# Patient Record
Sex: MALE | Race: WHITE | NOT HISPANIC OR LATINO | Employment: OTHER | URBAN - METROPOLITAN AREA
[De-identification: names, ages, dates, MRNs, and addresses within clinical notes are randomized per-mention and may not be internally consistent; named-entity substitution may affect disease eponyms.]

---

## 2017-03-21 ENCOUNTER — TRANSCRIBE ORDERS (OUTPATIENT)
Dept: LAB | Facility: CLINIC | Age: 62
End: 2017-03-21

## 2017-03-21 ENCOUNTER — APPOINTMENT (OUTPATIENT)
Dept: LAB | Facility: CLINIC | Age: 62
End: 2017-03-21
Payer: MEDICARE

## 2017-03-21 ENCOUNTER — LAB (OUTPATIENT)
Dept: LAB | Facility: CLINIC | Age: 62
End: 2017-03-21
Payer: MEDICARE

## 2017-03-21 DIAGNOSIS — N40.1 ENLARGED PROSTATE WITH URINARY OBSTRUCTION: ICD-10-CM

## 2017-03-21 DIAGNOSIS — Z00.00 ROUTINE GENERAL MEDICAL EXAMINATION AT A HEALTH CARE FACILITY: Primary | ICD-10-CM

## 2017-03-21 DIAGNOSIS — Z00.00 ROUTINE GENERAL MEDICAL EXAMINATION AT A HEALTH CARE FACILITY: ICD-10-CM

## 2017-03-21 DIAGNOSIS — N13.8 ENLARGED PROSTATE WITH URINARY OBSTRUCTION: Primary | ICD-10-CM

## 2017-03-21 DIAGNOSIS — N40.1 ENLARGED PROSTATE WITH URINARY OBSTRUCTION: Primary | ICD-10-CM

## 2017-03-21 DIAGNOSIS — N13.8 ENLARGED PROSTATE WITH URINARY OBSTRUCTION: ICD-10-CM

## 2017-03-21 LAB
ALBUMIN SERPL BCP-MCNC: 3.8 G/DL (ref 3.5–5)
ALP SERPL-CCNC: 55 U/L (ref 46–116)
ALT SERPL W P-5'-P-CCNC: 29 U/L (ref 12–78)
ANION GAP SERPL CALCULATED.3IONS-SCNC: 5 MMOL/L (ref 4–13)
AST SERPL W P-5'-P-CCNC: 24 U/L (ref 5–45)
BASOPHILS # BLD AUTO: 0.01 THOUSANDS/ΜL (ref 0–0.1)
BASOPHILS NFR BLD AUTO: 0 % (ref 0–1)
BILIRUB SERPL-MCNC: 0.5 MG/DL (ref 0.2–1)
BILIRUB UR QL STRIP: NEGATIVE
BUN SERPL-MCNC: 17 MG/DL (ref 5–25)
CALCIUM SERPL-MCNC: 8.6 MG/DL (ref 8.3–10.1)
CHLORIDE SERPL-SCNC: 105 MMOL/L (ref 100–108)
CHOLEST SERPL-MCNC: 176 MG/DL (ref 50–200)
CLARITY UR: CLEAR
CO2 SERPL-SCNC: 29 MMOL/L (ref 21–32)
COLOR UR: YELLOW
CREAT SERPL-MCNC: 0.88 MG/DL (ref 0.6–1.3)
EOSINOPHIL # BLD AUTO: 0.37 THOUSAND/ΜL (ref 0–0.61)
EOSINOPHIL NFR BLD AUTO: 6 % (ref 0–6)
ERYTHROCYTE [DISTWIDTH] IN BLOOD BY AUTOMATED COUNT: 13.2 % (ref 11.6–15.1)
GFR SERPL CREATININE-BSD FRML MDRD: >60 ML/MIN/1.73SQ M
GLUCOSE P FAST SERPL-MCNC: 83 MG/DL (ref 65–99)
GLUCOSE UR STRIP-MCNC: NEGATIVE MG/DL
HCT VFR BLD AUTO: 39.8 % (ref 36.5–49.3)
HDLC SERPL-MCNC: 61 MG/DL (ref 40–60)
HGB BLD-MCNC: 13.4 G/DL (ref 12–17)
HGB UR QL STRIP.AUTO: NEGATIVE
KETONES UR STRIP-MCNC: NEGATIVE MG/DL
LDLC SERPL CALC-MCNC: 102 MG/DL (ref 0–100)
LEUKOCYTE ESTERASE UR QL STRIP: NEGATIVE
LYMPHOCYTES # BLD AUTO: 1.54 THOUSANDS/ΜL (ref 0.6–4.47)
LYMPHOCYTES NFR BLD AUTO: 23 % (ref 14–44)
MCH RBC QN AUTO: 27.4 PG (ref 26.8–34.3)
MCHC RBC AUTO-ENTMCNC: 33.7 G/DL (ref 31.4–37.4)
MCV RBC AUTO: 81 FL (ref 82–98)
MONOCYTES # BLD AUTO: 0.35 THOUSAND/ΜL (ref 0.17–1.22)
MONOCYTES NFR BLD AUTO: 5 % (ref 4–12)
NEUTROPHILS # BLD AUTO: 4.43 THOUSANDS/ΜL (ref 1.85–7.62)
NEUTS SEG NFR BLD AUTO: 66 % (ref 43–75)
NITRITE UR QL STRIP: NEGATIVE
PH UR STRIP.AUTO: 6 [PH] (ref 4.5–8)
PLATELET # BLD AUTO: 302 THOUSANDS/UL (ref 149–390)
PMV BLD AUTO: 10.3 FL (ref 8.9–12.7)
POTASSIUM SERPL-SCNC: 3.9 MMOL/L (ref 3.5–5.3)
PROT SERPL-MCNC: 7.6 G/DL (ref 6.4–8.2)
PROT UR STRIP-MCNC: NEGATIVE MG/DL
PSA SERPL-MCNC: 0.4 NG/ML (ref 0–4)
RBC # BLD AUTO: 4.89 MILLION/UL (ref 3.88–5.62)
SODIUM SERPL-SCNC: 139 MMOL/L (ref 136–145)
SP GR UR STRIP.AUTO: 1.02 (ref 1–1.03)
TRIGL SERPL-MCNC: 67 MG/DL
TSH SERPL DL<=0.05 MIU/L-ACNC: 1.6 UIU/ML (ref 0.36–3.74)
UROBILINOGEN UR QL STRIP.AUTO: 0.2 E.U./DL
WBC # BLD AUTO: 6.7 THOUSAND/UL (ref 4.31–10.16)

## 2017-03-21 PROCEDURE — 84443 ASSAY THYROID STIM HORMONE: CPT

## 2017-03-21 PROCEDURE — 80061 LIPID PANEL: CPT

## 2017-03-21 PROCEDURE — 80053 COMPREHEN METABOLIC PANEL: CPT

## 2017-03-21 PROCEDURE — 85025 COMPLETE CBC W/AUTO DIFF WBC: CPT

## 2017-03-21 PROCEDURE — 36415 COLL VENOUS BLD VENIPUNCTURE: CPT

## 2017-03-21 PROCEDURE — 81003 URINALYSIS AUTO W/O SCOPE: CPT | Performed by: FAMILY MEDICINE

## 2017-03-21 PROCEDURE — 86803 HEPATITIS C AB TEST: CPT

## 2017-03-21 PROCEDURE — G0103 PSA SCREENING: HCPCS

## 2017-03-22 LAB — HCV AB SER QL: NORMAL

## 2017-08-11 ENCOUNTER — TRANSCRIBE ORDERS (OUTPATIENT)
Dept: LAB | Facility: CLINIC | Age: 62
End: 2017-08-11

## 2017-08-11 ENCOUNTER — APPOINTMENT (OUTPATIENT)
Dept: LAB | Facility: CLINIC | Age: 62
End: 2017-08-11
Payer: MEDICARE

## 2017-08-11 DIAGNOSIS — N42.9 UNSPECIFIED DISORDER OF PROSTATE: Primary | ICD-10-CM

## 2017-08-11 DIAGNOSIS — N42.9 UNSPECIFIED DISORDER OF PROSTATE: ICD-10-CM

## 2017-08-11 LAB
PSA SERPL-MCNC: 0.3 NG/ML (ref 0–4)
TESTOST SERPL-MCNC: 241 NG/DL (ref 241–827)

## 2017-08-11 PROCEDURE — 84403 ASSAY OF TOTAL TESTOSTERONE: CPT

## 2017-08-11 PROCEDURE — 84153 ASSAY OF PSA TOTAL: CPT

## 2017-08-11 PROCEDURE — 36415 COLL VENOUS BLD VENIPUNCTURE: CPT

## 2018-02-23 ENCOUNTER — TRANSCRIBE ORDERS (OUTPATIENT)
Dept: LAB | Facility: CLINIC | Age: 63
End: 2018-02-23

## 2018-02-23 ENCOUNTER — LAB (OUTPATIENT)
Dept: LAB | Facility: CLINIC | Age: 63
End: 2018-02-23
Payer: MEDICARE

## 2018-02-23 DIAGNOSIS — N40.0 BENIGN PROSTATIC HYPERPLASIA, UNSPECIFIED WHETHER LOWER URINARY TRACT SYMPTOMS PRESENT: Primary | ICD-10-CM

## 2018-02-23 DIAGNOSIS — N40.0 BENIGN PROSTATIC HYPERPLASIA, UNSPECIFIED WHETHER LOWER URINARY TRACT SYMPTOMS PRESENT: ICD-10-CM

## 2018-02-23 DIAGNOSIS — E29.1 3-OXO-5 ALPHA-STEROID DELTA 4-DEHYDROGENASE DEFICIENCY: ICD-10-CM

## 2018-02-23 LAB
BUN SERPL-MCNC: 15 MG/DL (ref 5–25)
CREAT SERPL-MCNC: 0.87 MG/DL (ref 0.6–1.3)
GFR SERPL CREATININE-BSD FRML MDRD: 92 ML/MIN/1.73SQ M
PSA SERPL-MCNC: 0.1 NG/ML (ref 0–4)

## 2018-02-23 PROCEDURE — 84402 ASSAY OF FREE TESTOSTERONE: CPT

## 2018-02-23 PROCEDURE — G0103 PSA SCREENING: HCPCS

## 2018-02-23 PROCEDURE — 36415 COLL VENOUS BLD VENIPUNCTURE: CPT

## 2018-02-23 PROCEDURE — 82565 ASSAY OF CREATININE: CPT

## 2018-02-23 PROCEDURE — 84520 ASSAY OF UREA NITROGEN: CPT

## 2018-02-23 PROCEDURE — 84403 ASSAY OF TOTAL TESTOSTERONE: CPT

## 2018-02-24 LAB
TESTOST FREE SERPL-MCNC: 0.8 PG/ML (ref 6.6–18.1)
TESTOST SERPL-MCNC: <3 NG/DL (ref 264–916)

## 2019-04-10 ENCOUNTER — HOSPITAL ENCOUNTER (EMERGENCY)
Facility: HOSPITAL | Age: 64
Discharge: HOME | End: 2019-04-10
Attending: EMERGENCY MEDICINE | Admitting: EMERGENCY MEDICINE
Payer: MEDICARE

## 2019-04-10 ENCOUNTER — APPOINTMENT (EMERGENCY)
Dept: RADIOLOGY | Facility: HOSPITAL | Age: 64
End: 2019-04-10
Payer: MEDICARE

## 2019-04-10 VITALS
OXYGEN SATURATION: 97 % | DIASTOLIC BLOOD PRESSURE: 86 MMHG | RESPIRATION RATE: 18 BRPM | HEART RATE: 94 BPM | TEMPERATURE: 99.6 F | SYSTOLIC BLOOD PRESSURE: 150 MMHG

## 2019-04-10 DIAGNOSIS — J20.9 ACUTE WHEEZY BRONCHITIS: Primary | ICD-10-CM

## 2019-04-10 PROCEDURE — 99283 EMERGENCY DEPT VISIT LOW MDM: CPT | Performed by: PHYSICIAN ASSISTANT

## 2019-04-10 PROCEDURE — 99283 EMERGENCY DEPT VISIT LOW MDM: CPT

## 2019-04-10 PROCEDURE — 71046 X-RAY EXAM CHEST 2 VIEWS: CPT

## 2019-04-10 PROCEDURE — 94640 AIRWAY INHALATION TREATMENT: CPT

## 2019-04-10 RX ORDER — PROMETHAZINE HYDROCHLORIDE AND CODEINE PHOSPHATE 6.25; 1 MG/5ML; MG/5ML
5 SYRUP ORAL EVERY 4 HOURS PRN
Qty: 120 ML | Refills: 0 | Status: SHIPPED | OUTPATIENT
Start: 2019-04-10 | End: 2019-04-20

## 2019-04-10 RX ORDER — PREDNISONE 20 MG/1
60 TABLET ORAL ONCE
Status: COMPLETED | OUTPATIENT
Start: 2019-04-10 | End: 2019-04-10

## 2019-04-10 RX ORDER — AZITHROMYCIN 250 MG/1
TABLET, FILM COATED ORAL
Qty: 6 TABLET | Refills: 0 | Status: SHIPPED | OUTPATIENT
Start: 2019-04-10 | End: 2019-04-14

## 2019-04-10 RX ORDER — PREDNISONE 20 MG/1
60 TABLET ORAL DAILY
Qty: 12 TABLET | Refills: 0 | Status: SHIPPED | OUTPATIENT
Start: 2019-04-10 | End: 2019-04-14

## 2019-04-10 RX ORDER — GUAIFENESIN 600 MG
600 TABLET, EXTENDED RELEASE 12 HR ORAL ONCE
Status: COMPLETED | OUTPATIENT
Start: 2019-04-10 | End: 2019-04-10

## 2019-04-10 RX ADMIN — GUAIFENESIN 600 MG: 600 TABLET, EXTENDED RELEASE ORAL at 21:56

## 2019-04-10 RX ADMIN — PREDNISONE 60 MG: 20 TABLET ORAL at 21:56

## 2019-04-10 RX ADMIN — IPRATROPIUM BROMIDE 0.5 MG: 0.5 SOLUTION RESPIRATORY (INHALATION) at 21:57

## 2019-04-10 RX ADMIN — ALBUTEROL SULFATE 5 MG: 2.5 SOLUTION RESPIRATORY (INHALATION) at 21:57

## 2019-06-05 ENCOUNTER — APPOINTMENT (EMERGENCY)
Dept: RADIOLOGY | Facility: HOSPITAL | Age: 64
End: 2019-06-05
Payer: MEDICARE

## 2019-06-05 ENCOUNTER — HOSPITAL ENCOUNTER (EMERGENCY)
Facility: HOSPITAL | Age: 64
Discharge: HOME | End: 2019-06-05
Attending: EMERGENCY MEDICINE | Admitting: EMERGENCY MEDICINE
Payer: MEDICARE

## 2019-06-05 VITALS
WEIGHT: 201.6 LBS | OXYGEN SATURATION: 96 % | RESPIRATION RATE: 18 BRPM | DIASTOLIC BLOOD PRESSURE: 68 MMHG | SYSTOLIC BLOOD PRESSURE: 154 MMHG | BODY MASS INDEX: 28.93 KG/M2 | HEART RATE: 75 BPM

## 2019-06-05 DIAGNOSIS — S62.307A CLOSED NONDISPLACED FRACTURE OF FIFTH METACARPAL BONE OF LEFT HAND, UNSPECIFIED PORTION OF METACARPAL, INITIAL ENCOUNTER: Primary | ICD-10-CM

## 2019-06-05 PROCEDURE — 99284 EMERGENCY DEPT VISIT MOD MDM: CPT | Performed by: PHYSICIAN ASSISTANT

## 2019-06-05 PROCEDURE — 73130 X-RAY EXAM OF HAND: CPT

## 2019-06-05 PROCEDURE — 99283 EMERGENCY DEPT VISIT LOW MDM: CPT

## 2019-06-05 RX ORDER — IBUPROFEN 400 MG/1
800 TABLET ORAL ONCE
Status: DISCONTINUED | OUTPATIENT
Start: 2019-06-05 | End: 2019-06-05 | Stop reason: HOSPADM

## 2019-06-10 ENCOUNTER — OFFICE VISIT (OUTPATIENT)
Dept: OCCUPATIONAL THERAPY | Facility: CLINIC | Age: 64
End: 2019-06-10
Payer: MEDICARE

## 2019-06-10 VITALS
HEIGHT: 69 IN | WEIGHT: 200 LBS | DIASTOLIC BLOOD PRESSURE: 74 MMHG | HEART RATE: 72 BPM | BODY MASS INDEX: 29.62 KG/M2 | SYSTOLIC BLOOD PRESSURE: 122 MMHG

## 2019-06-10 DIAGNOSIS — S62.307A CLOSED NONDISPLACED FRACTURE OF FIFTH METACARPAL BONE OF LEFT HAND, UNSPECIFIED PORTION OF METACARPAL, INITIAL ENCOUNTER: Primary | ICD-10-CM

## 2019-06-10 PROCEDURE — L3906 WHO W/O JOINTS CF: HCPCS | Performed by: OCCUPATIONAL THERAPIST

## 2019-06-10 PROCEDURE — 99203 OFFICE O/P NEW LOW 30 MIN: CPT | Performed by: SURGERY

## 2019-06-19 ENCOUNTER — APPOINTMENT (OUTPATIENT)
Dept: RADIOLOGY | Facility: AMBULARY SURGERY CENTER | Age: 64
End: 2019-06-19
Attending: SURGERY
Payer: MEDICARE

## 2019-06-19 VITALS
HEIGHT: 70 IN | WEIGHT: 204 LBS | BODY MASS INDEX: 29.2 KG/M2 | SYSTOLIC BLOOD PRESSURE: 129 MMHG | DIASTOLIC BLOOD PRESSURE: 80 MMHG | HEART RATE: 78 BPM

## 2019-06-19 DIAGNOSIS — S62.307A CLOSED NONDISPLACED FRACTURE OF FIFTH METACARPAL BONE OF LEFT HAND, UNSPECIFIED PORTION OF METACARPAL, INITIAL ENCOUNTER: ICD-10-CM

## 2019-06-19 DIAGNOSIS — M79.641 RIGHT HAND PAIN: ICD-10-CM

## 2019-06-19 DIAGNOSIS — S62.307A CLOSED NONDISPLACED FRACTURE OF FIFTH METACARPAL BONE OF LEFT HAND, UNSPECIFIED PORTION OF METACARPAL, INITIAL ENCOUNTER: Primary | ICD-10-CM

## 2019-06-19 PROCEDURE — 99213 OFFICE O/P EST LOW 20 MIN: CPT | Performed by: SURGERY

## 2019-06-19 PROCEDURE — 73130 X-RAY EXAM OF HAND: CPT

## 2019-07-02 ENCOUNTER — TRANSCRIBE ORDERS (OUTPATIENT)
Dept: LAB | Facility: CLINIC | Age: 64
End: 2019-07-02

## 2019-07-02 ENCOUNTER — APPOINTMENT (OUTPATIENT)
Dept: LAB | Facility: CLINIC | Age: 64
End: 2019-07-02
Payer: MEDICARE

## 2019-07-02 DIAGNOSIS — E78.2 MIXED HYPERLIPIDEMIA: ICD-10-CM

## 2019-07-02 DIAGNOSIS — I10 HYPERTENSION, UNSPECIFIED TYPE: Primary | ICD-10-CM

## 2019-07-02 DIAGNOSIS — I10 HYPERTENSION, UNSPECIFIED TYPE: ICD-10-CM

## 2019-07-02 DIAGNOSIS — Z12.5 SPECIAL SCREENING FOR MALIGNANT NEOPLASM OF PROSTATE: ICD-10-CM

## 2019-07-02 LAB
ALBUMIN SERPL BCP-MCNC: 3.8 G/DL (ref 3.5–5)
ALP SERPL-CCNC: 52 U/L (ref 46–116)
ALT SERPL W P-5'-P-CCNC: 26 U/L (ref 12–78)
ANION GAP SERPL CALCULATED.3IONS-SCNC: 9 MMOL/L (ref 4–13)
AST SERPL W P-5'-P-CCNC: 21 U/L (ref 5–45)
BASOPHILS # BLD AUTO: 0.03 THOUSANDS/ΜL (ref 0–0.1)
BASOPHILS NFR BLD AUTO: 0 % (ref 0–1)
BILIRUB SERPL-MCNC: 0.6 MG/DL (ref 0.2–1)
BILIRUB UR QL STRIP: NEGATIVE
BUN SERPL-MCNC: 18 MG/DL (ref 5–25)
CALCIUM SERPL-MCNC: 8.5 MG/DL (ref 8.3–10.1)
CHLORIDE SERPL-SCNC: 104 MMOL/L (ref 100–108)
CHOLEST SERPL-MCNC: 181 MG/DL (ref 50–200)
CLARITY UR: CLEAR
CO2 SERPL-SCNC: 26 MMOL/L (ref 21–32)
COLOR UR: YELLOW
CREAT SERPL-MCNC: 0.94 MG/DL (ref 0.6–1.3)
EOSINOPHIL # BLD AUTO: 0.19 THOUSAND/ΜL (ref 0–0.61)
EOSINOPHIL NFR BLD AUTO: 3 % (ref 0–6)
ERYTHROCYTE [DISTWIDTH] IN BLOOD BY AUTOMATED COUNT: 12.9 % (ref 11.6–15.1)
ERYTHROCYTE [SEDIMENTATION RATE] IN BLOOD: 5 MM/HOUR (ref 0–10)
GFR SERPL CREATININE-BSD FRML MDRD: 85 ML/MIN/1.73SQ M
GLUCOSE P FAST SERPL-MCNC: 88 MG/DL (ref 65–99)
GLUCOSE UR STRIP-MCNC: NEGATIVE MG/DL
HCT VFR BLD AUTO: 40.5 % (ref 36.5–49.3)
HDLC SERPL-MCNC: 49 MG/DL (ref 40–60)
HGB BLD-MCNC: 13.8 G/DL (ref 12–17)
HGB UR QL STRIP.AUTO: NEGATIVE
IMM GRANULOCYTES # BLD AUTO: 0.03 THOUSAND/UL (ref 0–0.2)
IMM GRANULOCYTES NFR BLD AUTO: 0 % (ref 0–2)
IRON SERPL-MCNC: 102 UG/DL (ref 65–175)
KETONES UR STRIP-MCNC: NEGATIVE MG/DL
LDH SERPL-CCNC: 194 U/L (ref 81–234)
LDLC SERPL CALC-MCNC: 115 MG/DL (ref 0–100)
LEUKOCYTE ESTERASE UR QL STRIP: NEGATIVE
LYMPHOCYTES # BLD AUTO: 1.56 THOUSANDS/ΜL (ref 0.6–4.47)
LYMPHOCYTES NFR BLD AUTO: 23 % (ref 14–44)
MCH RBC QN AUTO: 28.3 PG (ref 26.8–34.3)
MCHC RBC AUTO-ENTMCNC: 34.1 G/DL (ref 31.4–37.4)
MCV RBC AUTO: 83 FL (ref 82–98)
MONOCYTES # BLD AUTO: 0.48 THOUSAND/ΜL (ref 0.17–1.22)
MONOCYTES NFR BLD AUTO: 7 % (ref 4–12)
NEUTROPHILS # BLD AUTO: 4.43 THOUSANDS/ΜL (ref 1.85–7.62)
NEUTS SEG NFR BLD AUTO: 67 % (ref 43–75)
NITRITE UR QL STRIP: NEGATIVE
NONHDLC SERPL-MCNC: 132 MG/DL
NRBC BLD AUTO-RTO: 0 /100 WBCS
PH UR STRIP.AUTO: 6.5 [PH]
PLATELET # BLD AUTO: 291 THOUSANDS/UL (ref 149–390)
PMV BLD AUTO: 10.1 FL (ref 8.9–12.7)
POTASSIUM SERPL-SCNC: 3.9 MMOL/L (ref 3.5–5.3)
PROT SERPL-MCNC: 7.4 G/DL (ref 6.4–8.2)
PROT UR STRIP-MCNC: NEGATIVE MG/DL
PSA SERPL-MCNC: 0.7 NG/ML (ref 0–4)
RBC # BLD AUTO: 4.88 MILLION/UL (ref 3.88–5.62)
SODIUM SERPL-SCNC: 139 MMOL/L (ref 136–145)
SP GR UR STRIP.AUTO: 1.02 (ref 1–1.03)
TRIGL SERPL-MCNC: 86 MG/DL
TSH SERPL DL<=0.05 MIU/L-ACNC: 0.92 UIU/ML (ref 0.36–3.74)
UROBILINOGEN UR QL STRIP.AUTO: 0.2 E.U./DL
WBC # BLD AUTO: 6.72 THOUSAND/UL (ref 4.31–10.16)

## 2019-07-02 PROCEDURE — 83615 LACTATE (LD) (LDH) ENZYME: CPT

## 2019-07-02 PROCEDURE — 36415 COLL VENOUS BLD VENIPUNCTURE: CPT

## 2019-07-02 PROCEDURE — 84443 ASSAY THYROID STIM HORMONE: CPT

## 2019-07-02 PROCEDURE — 83540 ASSAY OF IRON: CPT

## 2019-07-02 PROCEDURE — 80061 LIPID PANEL: CPT

## 2019-07-02 PROCEDURE — 85652 RBC SED RATE AUTOMATED: CPT

## 2019-07-02 PROCEDURE — 85025 COMPLETE CBC W/AUTO DIFF WBC: CPT

## 2019-07-02 PROCEDURE — 81003 URINALYSIS AUTO W/O SCOPE: CPT | Performed by: INTERNAL MEDICINE

## 2019-07-02 PROCEDURE — G0103 PSA SCREENING: HCPCS

## 2019-07-02 PROCEDURE — 80053 COMPREHEN METABOLIC PANEL: CPT

## 2019-07-03 ENCOUNTER — APPOINTMENT (OUTPATIENT)
Dept: RADIOLOGY | Facility: AMBULARY SURGERY CENTER | Age: 64
End: 2019-07-03
Attending: SURGERY
Payer: MEDICARE

## 2019-07-03 VITALS
SYSTOLIC BLOOD PRESSURE: 132 MMHG | DIASTOLIC BLOOD PRESSURE: 77 MMHG | HEART RATE: 60 BPM | BODY MASS INDEX: 28.7 KG/M2 | HEIGHT: 71 IN | WEIGHT: 205 LBS

## 2019-07-03 DIAGNOSIS — S62.307A CLOSED NONDISPLACED FRACTURE OF FIFTH METACARPAL BONE OF LEFT HAND, UNSPECIFIED PORTION OF METACARPAL, INITIAL ENCOUNTER: Primary | ICD-10-CM

## 2019-07-03 DIAGNOSIS — S62.307A CLOSED NONDISPLACED FRACTURE OF FIFTH METACARPAL BONE OF LEFT HAND, UNSPECIFIED PORTION OF METACARPAL, INITIAL ENCOUNTER: ICD-10-CM

## 2019-07-03 PROCEDURE — 99213 OFFICE O/P EST LOW 20 MIN: CPT | Performed by: SURGERY

## 2019-07-03 PROCEDURE — 73130 X-RAY EXAM OF HAND: CPT

## 2019-07-03 NOTE — PROGRESS NOTES
ASSESSMENT/PLAN:      51-year-old male with left non-displaced intra-articular chip fracture at the base of the 5th metacarpal and displaced 5th metacarpal neck fx, date of injury 06/05/2019  X-rays today show moderately displaced metacarpal neck fracture stable from previous x-ray and well-healing nondisplaced 5th metacarpal chip fracture  Patient doing overall well  We will keep him in the splint for another 2 weeks to for total of 6 weeks of immobilization  In 2 weeks we will see him back for repeat x-rays and re-evaluation, at this time we will likely discontinue splint altogether and release him for activities as tolerated  The patient verbalized understanding of exam findings and treatment plan  We engaged in the shared decision-making process and treatment options were discussed at length with the patient  Surgical and conservative management discussed today along with risks and benefits  Diagnoses and all orders for this visit:    Closed nondisplaced fracture of fifth metacarpal bone of left hand, unspecified portion of metacarpal, initial encounter  -     XR hand 3+ vw left; Future        Follow Up:  Return in about 2 weeks (around 7/17/2019) for Recheck  To Do Next Visit:  Re-evaluation of current issue and X-rays of the  left  hand    ____________________________________________________________________________________________________________________________________________      CHIEF COMPLAINT:  Chief Complaint   Patient presents with    Left Arm - Follow-up       SUBJECTIVE:  Anastasia Colvin is a 59y o  year old male who presents for follow up evaluation of left 5th metacarpal fracture at the base and the neck, date of injury 06/05/2019  Patient has been compliant in his splint for the past 4 weeks  He denies significant pain currently and has no numbness and tingling  I have personally reviewed all the relevant PMH, PSH, SH, FH, Medications and allergies       PAST MEDICAL HISTORY:  Past Medical History:   Diagnosis Date    Asthma     Hypertension        PAST SURGICAL HISTORY:  History reviewed  No pertinent surgical history  FAMILY HISTORY:  History reviewed  No pertinent family history  SOCIAL HISTORY:  Social History     Tobacco Use    Smoking status: Never Smoker    Smokeless tobacco: Never Used   Substance Use Topics    Alcohol use: Yes     Comment: seldom    Drug use: No       MEDICATIONS:    Current Outpatient Medications:     ALBUTEROL SULFATE IN, Inhale , Disp: , Rfl:     budesonide-formoterol (SYMBICORT) 160-4 5 mcg/act inhaler, Inhale 2 puffs 2 (two) times a day , Disp: , Rfl:     loratadine (CLARITIN) 10 mg tablet, Take 10 mg by mouth daily  , Disp: , Rfl:     MINOXIDIL PO, Take by mouth , Disp: , Rfl:     ALLERGIES:  No Known Allergies    REVIEW OF SYSTEMS:  Review of Systems   Constitutional: Negative for chills, fatigue, fever and unexpected weight change  HENT: Negative for congestion, dental problem, facial swelling, hearing loss, sneezing, sore throat, trouble swallowing and voice change  Respiratory: Negative for chest tightness, shortness of breath, wheezing and stridor  Cardiovascular: Negative for chest pain, palpitations and leg swelling  Endocrine: Negative for cold intolerance and heat intolerance  Genitourinary: Negative  Musculoskeletal: Negative for arthralgias, joint swelling, myalgias and neck pain  Skin: Negative for color change, pallor, rash and wound  Neurological: Negative for tremors, facial asymmetry, speech difficulty, weakness and numbness         VITALS:  Vitals:    07/03/19 1409   BP: 132/77   Pulse: 60       LABS:  HgA1c: No results found for: HGBA1C  BMP:   Lab Results   Component Value Date    CALCIUM 8 5 07/02/2019    K 3 9 07/02/2019    CO2 26 07/02/2019     07/02/2019    BUN 18 07/02/2019    CREATININE 0 94 07/02/2019       _____________________________________________________  PHYSICAL EXAMINATION:  General: well developed and well nourished, alert, oriented times 3 and appears comfortable  Psychiatric: Normal  HEENT: Normocephalic, Atraumatic Trachea Midline, No torticollis  Pulmonary: No audible wheezing or respiratory distress   Cardiovascular: No pitting edema, 2+ radial pulse   Skin: No masses, erythema, lacerations, fluctation, ulcerations  Neurovascular: Sensation Intact to the Median, Ulnar, Radial Nerve, Motor Intact to the Median, Ulnar, Radial Nerve and Pulses Intact  Musculoskeletal: Normal, except as noted in detailed exam and in HPI  MUSCULOSKELETAL EXAMINATION:  No significant edema, erythema, ecchymosis  Small palpable defect in the area of the 5th metacarpal neck fracture, but no tenderness with palpation  Full composite fist as possible, opposition intact  Sensation intact to light touch    ___________________________________________________  STUDIES REVIEWED:  I have personally reviewed AP lateral and oblique radiographs of left hand   which demonstrate moderately displaced 5th metacarpal neck fracture, stable from previous x-ray but displaced compared to the original injury films  Small intra-articular 5th metacarpal base fracture, nondisplaced, barely visible  PROCEDURES PERFORMED:  Procedures  No Procedures performed today    _____________________________________________________      PA Cosign: I supervised the physician assistant and discussed the case with them  I personally performed history and physical exam  I agree with the assessment and plan of care as documented by the physician assistant

## 2019-07-24 ENCOUNTER — APPOINTMENT (OUTPATIENT)
Dept: RADIOLOGY | Facility: AMBULARY SURGERY CENTER | Age: 64
End: 2019-07-24
Attending: SURGERY
Payer: MEDICARE

## 2019-07-24 VITALS
WEIGHT: 200 LBS | HEART RATE: 72 BPM | DIASTOLIC BLOOD PRESSURE: 71 MMHG | SYSTOLIC BLOOD PRESSURE: 148 MMHG | HEIGHT: 71 IN | BODY MASS INDEX: 28 KG/M2

## 2019-07-24 DIAGNOSIS — S62.307A CLOSED NONDISPLACED FRACTURE OF FIFTH METACARPAL BONE OF LEFT HAND, UNSPECIFIED PORTION OF METACARPAL, INITIAL ENCOUNTER: Primary | ICD-10-CM

## 2019-07-24 DIAGNOSIS — S62.307A CLOSED NONDISPLACED FRACTURE OF FIFTH METACARPAL BONE OF LEFT HAND, UNSPECIFIED PORTION OF METACARPAL, INITIAL ENCOUNTER: ICD-10-CM

## 2019-07-24 PROCEDURE — 99213 OFFICE O/P EST LOW 20 MIN: CPT | Performed by: SURGERY

## 2019-07-24 PROCEDURE — 73130 X-RAY EXAM OF HAND: CPT

## 2019-07-24 NOTE — PROGRESS NOTES
ASSESSMENT/PLAN:      59 y o  male with a healed left nondisplaced intra-articular chip fracture at the base of the 5th metacarpal and a displaced 5th metacarpal neck fracture that occurred on 06/05/19  May d/c the use of the splint  Activities as tolerated  Follow up as needed if symptoms worsen or fail to improve  The patient verbalized understanding of exam findings and treatment plan  We engaged in the shared decision-making process and treatment options were discussed at length with the patient  Surgical and conservative management discussed today along with risks and benefits  Diagnoses and all orders for this visit:    Closed nondisplaced fracture of fifth metacarpal bone of left hand, unspecified portion of metacarpal, initial encounter  -     XR hand 3+ vw left; Future      Follow Up:  Return if symptoms worsen or fail to improve  To Do Next Visit:  Re-evaluation of current issue    ____________________________________________________________________________________________________________________________________________      CHIEF COMPLAINT:  Chief Complaint   Patient presents with    Left Hand - Follow-up       SUBJECTIVE:  Trixie Brooks is a 59y o  year old male who presents to the office today for a follow up regarding a left hand fracture  Dagoberto Alonso has been immobilized in a splint  He denies any pain today  Dagoberto Alonso has been compliant with his splint  He is not taking anything for pain control  I have personally reviewed all the relevant PMH, PSH, SH, FH, Medications and allergies  PAST MEDICAL HISTORY:  Past Medical History:   Diagnosis Date    Asthma     Hypertension        PAST SURGICAL HISTORY:  History reviewed  No pertinent surgical history  FAMILY HISTORY:  History reviewed  No pertinent family history  SOCIAL HISTORY:  Social History     Tobacco Use    Smoking status: Never Smoker    Smokeless tobacco: Never Used   Substance Use Topics    Alcohol use:  Yes Comment: seldom    Drug use: No       MEDICATIONS:    Current Outpatient Medications:     ALBUTEROL SULFATE IN, Inhale , Disp: , Rfl:     budesonide-formoterol (SYMBICORT) 160-4 5 mcg/act inhaler, Inhale 2 puffs 2 (two) times a day , Disp: , Rfl:     loratadine (CLARITIN) 10 mg tablet, Take 10 mg by mouth daily  , Disp: , Rfl:     MINOXIDIL PO, Take by mouth , Disp: , Rfl:     ALLERGIES:  No Known Allergies    REVIEW OF SYSTEMS:  Review of Systems   Constitutional: Negative for chills, fever and unexpected weight change  HENT: Negative for hearing loss, nosebleeds and sore throat  Eyes: Negative for pain, redness and visual disturbance  Respiratory: Negative for cough, shortness of breath and wheezing  Cardiovascular: Negative for chest pain, palpitations and leg swelling  Gastrointestinal: Negative for abdominal pain, nausea and vomiting  Endocrine: Negative for polydipsia and polyuria  Genitourinary: Negative for difficulty urinating and hematuria  Musculoskeletal: Negative for arthralgias, joint swelling and myalgias  Skin: Negative for rash and wound  Neurological: Negative for dizziness, numbness and headaches  Psychiatric/Behavioral: Negative for decreased concentration, dysphoric mood and suicidal ideas  The patient is not nervous/anxious          VITALS:  Vitals:    07/24/19 1418   BP: 148/71   Pulse: 72       LABS:  HgA1c: No results found for: HGBA1C  BMP:   Lab Results   Component Value Date    CALCIUM 8 5 07/02/2019    K 3 9 07/02/2019    CO2 26 07/02/2019     07/02/2019    BUN 18 07/02/2019    CREATININE 0 94 07/02/2019       _____________________________________________________  PHYSICAL EXAMINATION:  General: well developed and well nourished, alert, oriented times 3 and appears comfortable  Psychiatric: Normal  HEENT: Normocephalic, Atraumatic Trachea Midline, No torticollis  Pulmonary: No audible wheezing or respiratory distress   Cardiovascular: No pitting edema, 2+ radial pulse   Skin: No masses, erythema, lacerations, fluctation, ulcerations  Neurovascular: Sensation Intact to the Median, Ulnar, Radial Nerve, Motor Intact to the Median, Ulnar, Radial Nerve and Pulses Intact  Musculoskeletal: Normal, except as noted in detailed exam and in HPI        MUSCULOSKELETAL EXAMINATION:    Left hand:    No erythema  No ecchymosis   No edema  Non tender to palpation over fracture site   Full composite fist   Sensation intact   Brisk capillary refill     ___________________________________________________  STUDIES REVIEWED:  I have personally reviewed AP lateral and oblique radiographs of    left hand demonstrates a healed metacarpal neck fracture, and a healed 5th metacarpal base fracture          PROCEDURES PERFORMED:  Procedures  No Procedures performed today    _____________________________________________________      Elza Hamlin    I,:   Apollo Hernadez am acting as a scribe while in the presence of the attending physician :        I,:   Adina Pool MD personally performed the services described in this documentation    as scribed in my presence :

## 2019-09-17 ENCOUNTER — APPOINTMENT (OUTPATIENT)
Dept: LAB | Facility: CLINIC | Age: 64
End: 2019-09-17
Payer: MEDICARE

## 2019-09-17 ENCOUNTER — TRANSCRIBE ORDERS (OUTPATIENT)
Dept: LAB | Facility: CLINIC | Age: 64
End: 2019-09-17

## 2019-09-17 DIAGNOSIS — N13.8 ENLARGED PROSTATE WITH URINARY OBSTRUCTION: ICD-10-CM

## 2019-09-17 DIAGNOSIS — E29.1 3-OXO-5 ALPHA-STEROID DELTA 4-DEHYDROGENASE DEFICIENCY: Primary | ICD-10-CM

## 2019-09-17 DIAGNOSIS — N40.1 ENLARGED PROSTATE WITH URINARY OBSTRUCTION: ICD-10-CM

## 2019-09-17 DIAGNOSIS — E29.1 3-OXO-5 ALPHA-STEROID DELTA 4-DEHYDROGENASE DEFICIENCY: ICD-10-CM

## 2019-09-17 LAB
BUN SERPL-MCNC: 21 MG/DL (ref 5–25)
CREAT SERPL-MCNC: 0.85 MG/DL (ref 0.6–1.3)
GFR SERPL CREATININE-BSD FRML MDRD: 92 ML/MIN/1.73SQ M
PSA SERPL-MCNC: 0.6 NG/ML (ref 0–4)
TESTOST SERPL-MCNC: 448 NG/DL (ref 95–948)

## 2019-09-17 PROCEDURE — 84403 ASSAY OF TOTAL TESTOSTERONE: CPT

## 2019-09-17 PROCEDURE — 84520 ASSAY OF UREA NITROGEN: CPT

## 2019-09-17 PROCEDURE — 82565 ASSAY OF CREATININE: CPT

## 2019-09-17 PROCEDURE — 36415 COLL VENOUS BLD VENIPUNCTURE: CPT

## 2019-09-17 PROCEDURE — G0103 PSA SCREENING: HCPCS

## 2019-11-29 ENCOUNTER — HOSPITAL ENCOUNTER (EMERGENCY)
Facility: HOSPITAL | Age: 64
Discharge: HOME | End: 2019-11-29
Payer: MEDICARE

## 2019-11-29 VITALS
WEIGHT: 211.2 LBS | DIASTOLIC BLOOD PRESSURE: 90 MMHG | RESPIRATION RATE: 16 BRPM | OXYGEN SATURATION: 98 % | HEART RATE: 65 BPM | BODY MASS INDEX: 29.88 KG/M2 | SYSTOLIC BLOOD PRESSURE: 177 MMHG | TEMPERATURE: 99 F

## 2019-11-29 DIAGNOSIS — S61.419A HAND LACERATION: Primary | ICD-10-CM

## 2019-11-29 PROCEDURE — 90471 IMMUNIZATION ADMIN: CPT

## 2019-11-29 PROCEDURE — 99284 EMERGENCY DEPT VISIT MOD MDM: CPT | Performed by: PHYSICIAN ASSISTANT

## 2019-11-29 PROCEDURE — 90715 TDAP VACCINE 7 YRS/> IM: CPT | Performed by: PHYSICIAN ASSISTANT

## 2019-11-29 PROCEDURE — 12001 RPR S/N/AX/GEN/TRNK 2.5CM/<: CPT | Performed by: PHYSICIAN ASSISTANT

## 2019-11-29 PROCEDURE — 99282 EMERGENCY DEPT VISIT SF MDM: CPT

## 2019-11-29 RX ORDER — LIDOCAINE HYDROCHLORIDE 10 MG/ML
4 INJECTION, SOLUTION EPIDURAL; INFILTRATION; INTRACAUDAL; PERINEURAL ONCE
Status: COMPLETED | OUTPATIENT
Start: 2019-11-29 | End: 2019-11-29

## 2019-11-29 RX ORDER — GINSENG 100 MG
1 CAPSULE ORAL ONCE
Status: COMPLETED | OUTPATIENT
Start: 2019-11-29 | End: 2019-11-29

## 2019-11-29 RX ADMIN — LIDOCAINE HYDROCHLORIDE 4 ML: 10 INJECTION, SOLUTION EPIDURAL; INFILTRATION; INTRACAUDAL; PERINEURAL at 21:44

## 2019-11-29 RX ADMIN — BACITRACIN 1 SMALL APPLICATION: 500 OINTMENT TOPICAL at 21:45

## 2019-11-29 RX ADMIN — TETANUS TOXOID, REDUCED DIPHTHERIA TOXOID AND ACELLULAR PERTUSSIS VACCINE, ADSORBED 0.5 ML: 5; 2.5; 8; 8; 2.5 SUSPENSION INTRAMUSCULAR at 21:43

## 2019-12-06 NOTE — ED PROVIDER NOTES
Pt Name: Pastor Tracy  MRN: [id number]  YOB: 1955  Age/Sex: 59 y o  male  Date of evaluation: 11/29/2019  PCP: Jan Kapoor MD    [address] [place] [address]    Chief Complaint   Patient presents with    Hand Laceration     Patient was cleaning glasses approximately one hour ago, broke glass and now may have glass in right hand  Not currently bleeding at this time  HPI    Maritza Shahid presents to the Emergency Department complaining of Hand Injury  Pastor Tracy is a 59 y o  male who presents due to Hand Laceration  Pt reports Hand Laceration, sts he was cleaning glasses approximately one hour ago, broke glass and now may have glass in right hand  Not currently bleeding at this time  Not UTD tetanus  No other complaints at this time  History provided by:  Patient   used: No          Past Medical and Surgical History    Past Medical History:   Diagnosis Date    Asthma     Hypertension        History reviewed  No pertinent surgical history  History reviewed  No pertinent family history  Social History     Tobacco Use    Smoking status: Never Smoker    Smokeless tobacco: Never Used   Substance Use Topics    Alcohol use: Yes     Comment: seldom    Drug use: No       Allergies    No Known Allergies    Home Medications:    Prior to Admission medications    Medication Sig Start Date End Date Taking? Authorizing Provider   ALBUTEROL SULFATE IN Inhale  Yes Historical Provider, MD   budesonide-formoterol (SYMBICORT) 160-4 5 mcg/act inhaler Inhale 2 puffs 2 (two) times a day  Yes Historical Provider, MD   loratadine (CLARITIN) 10 mg tablet Take 10 mg by mouth daily  Historical Provider, MD   MINOXIDIL PO Take 10 mg by mouth 2 (two) times a day     Historical Provider, MD           Review of Systems    Review of Systems   Constitutional: Negative for activity change, appetite change, chills, diaphoresis, fatigue and fever     Respiratory: Negative for cough and shortness of breath  Cardiovascular: Negative for chest pain  Gastrointestinal: Negative for abdominal pain  Skin: Positive for wound  Negative for rash  Neurological: Negative for headaches  All other systems reviewed and negative  Physical Exam      ED Triage Vitals [11/29/19 2112]   Temperature Pulse Respirations Blood Pressure SpO2   99 °F (37 2 °C) 65 16 (!) 177/90 98 %      Temp Source Heart Rate Source Patient Position - Orthostatic VS BP Location FiO2 (%)   Oral Monitor Lying Right arm --      Pain Score       --               Physical Exam   Constitutional: He is oriented to person, place, and time  He appears well-developed and well-nourished  No distress  HENT:   Head: Normocephalic and atraumatic  Eyes: Pupils are equal, round, and reactive to light  Conjunctivae and EOM are normal    Neck: Normal range of motion  Neck supple  Cardiovascular: Normal rate, regular rhythm, normal heart sounds and intact distal pulses  Exam reveals no gallop and no friction rub  No murmur heard  Pulmonary/Chest: Effort normal and breath sounds normal  No respiratory distress  He has no wheezes  He has no rales  He exhibits no tenderness  Musculoskeletal: Normal range of motion  Right hand: He exhibits laceration ( 75 cm)  He exhibits normal range of motion, no tenderness, no bony tenderness, normal two-point discrimination, normal capillary refill, no deformity and no swelling  Normal sensation noted  Normal strength noted  Hands:  Neurological: He is alert and oriented to person, place, and time  Skin: Skin is warm  Capillary refill takes less than 2 seconds  He is not diaphoretic  Nursing note and vitals reviewed            Diagnostic Results    ECG      Labs:    Results for orders placed or performed in visit on 09/17/19   BUN   Result Value Ref Range    BUN 21 5 - 25 mg/dL   Creatinine, serum   Result Value Ref Range    Creatinine 0 85 0 60 - 1 30 mg/dL    eGFR 92 ml/min/1 73sq m   PSA, Total Screen   Result Value Ref Range    PSA 0 6 0 0 - 4 0 ng/mL   Testosterone   Result Value Ref Range    Testosterone 448 0 95 - 948 ng/dL       All labs reviewed and utilized in the medical decision making process    Radiology:    No orders to display       All radiology studies independently viewed by me and interpreted by the radiologist     Procedure    Laceration repair  Date/Time: 11/29/2019 10:01 PM  Performed by: Adriano Mcclain PA-C  Authorized by: Adriano Mcclain PA-C   Consent: Verbal consent obtained  Risks and benefits: risks, benefits and alternatives were discussed  Consent given by: patient  Patient understanding: patient states understanding of the procedure being performed  Patient identity confirmed: verbally with patient, arm band and hospital-assigned identification number  Body area: upper extremity  Location details: right hand  Laceration length: 0 8 cm  Foreign bodies: glass  Tendon involvement: none  Nerve involvement: none  Vascular damage: no  Anesthesia: local infiltration    Anesthesia:  Local Anesthetic: lidocaine 1% without epinephrine      Procedure Details:  Preparation: Patient was prepped and draped in the usual sterile fashion  Irrigation solution: saline  Irrigation method: jet lavage  Amount of cleaning: extensive  Debridement: none  Degree of undermining: none  Skin closure: 5-0 nylon  Number of sutures: 4  Technique: simple  Approximation: close  Dressing: antibiotic ointment and gauze roll  Patient tolerance: Patient tolerated the procedure well with no immediate complications            Assessment and Plan    MDM    Initial ED assessment:  Neville Daly is a 59 y o  male with no significant PMH who presents with Laceration  Vitals signs reviewed and WNL  Physical examination remarkable for   75 cm laceration R hand      Initial Ddx  includes but is not limited to:   laceration, deep structure involvement, foreign body, fracture, wound infection  Initial ED plan:   Plan will be to perform  and treat symptomatically with laceration repair, tetanus  Final ED summary/disposition: Discussed home care recommendations given with discharge paperwork  Return to ED instructions given if new/worsening sxs  Coding    ED Course of Care and Re-Assessments                            Medications   lidocaine (PF) (XYLOCAINE-MPF) 1 % injection 4 mL (4 mL Infiltration Given 11/29/19 2144)   tetanus-diphtheria-acellular pertussis (BOOSTRIX) IM injection 0 5 mL (0 5 mL Intramuscular Given 11/29/19 2143)   bacitracin topical ointment 1 small application (1 small application Topical Given 11/29/19 2145)         FINAL IMPRESSION    Final diagnoses:   Hand laceration         DISPOSITION/PLAN  Time reflects when diagnosis was documented in both MDM as applicable and the Disposition within this note     Time User Action Codes Description Comment    11/29/2019 10:00 PM Benita Fung Add [[id number]] Hand laceration       ED Disposition     ED Disposition Condition Date/Time Comment    Discharge Stable Fri Nov 29, 2019 10:00 PM Hector Trujillo discharge to home/self care  Follow-up Information     Follow up With Specialties Details Why Contact Info Additional Information    Earle Curling, MD Internal Medicine Go to  For follow up [address] [facility]  [address]  [address] [place] [address]  [facility] Emergency Department Emergency Medicine Go to  If symptoms worsen [address]  [phone]  ED, [facility] [address], [place], [place], [address]              PATIENT REFERRED TO:    Earle Curling, MD  [address] [facility]  [address]   [address] [place]  [phone]    Go to   For follow up    [facility]  [facility] [place] [address]  [phone]  Go to   If symptoms worsen      DISCHARGE MEDICATIONS:    Discharge Medication List as of 11/29/2019 10:03 PM      CONTINUE these medications which have NOT CHANGED    Details   ALBUTEROL SULFATE IN Inhale , Until Discontinued, Historical Med      budesonide-formoterol (SYMBICORT) 160-4 5 mcg/act inhaler Inhale 2 puffs 2 (two) times a day , Until Discontinued, Historical Med      loratadine (CLARITIN) 10 mg tablet Take 10 mg by mouth daily  , Until Discontinued, Historical Med      MINOXIDIL PO Take 10 mg by mouth 2 (two) times a day , Historical Med             No discharge procedures on file           JANAY Seth PA-C  12/06/19 [id number]

## 2019-12-20 ENCOUNTER — ANESTHESIA EVENT (OUTPATIENT)
Dept: GASTROENTEROLOGY | Facility: AMBULARY SURGERY CENTER | Age: 64
End: 2019-12-20

## 2020-01-03 ENCOUNTER — ANESTHESIA (OUTPATIENT)
Dept: GASTROENTEROLOGY | Facility: AMBULARY SURGERY CENTER | Age: 65
End: 2020-01-03

## 2020-02-05 ENCOUNTER — HOSPITAL ENCOUNTER (OUTPATIENT)
Dept: GASTROENTEROLOGY | Facility: HOSPITAL | Age: 65
Setting detail: OUTPATIENT SURGERY
Discharge: HOME | End: 2020-02-05
Attending: COLON & RECTAL SURGERY | Admitting: COLON & RECTAL SURGERY
Payer: MEDICARE

## 2020-02-05 VITALS
BODY MASS INDEX: 29.28 KG/M2 | OXYGEN SATURATION: 98 % | WEIGHT: 207 LBS | DIASTOLIC BLOOD PRESSURE: 68 MMHG | HEART RATE: 83 BPM | TEMPERATURE: 97.9 F | SYSTOLIC BLOOD PRESSURE: 118 MMHG | RESPIRATION RATE: 17 BRPM

## 2020-02-05 DIAGNOSIS — Z86.010 PERSONAL HISTORY OF COLONIC POLYPS: ICD-10-CM

## 2020-02-05 PROCEDURE — 45378 DIAGNOSTIC COLONOSCOPY: CPT | Performed by: COLON & RECTAL SURGERY

## 2020-02-05 PROCEDURE — 99213 OFFICE O/P EST LOW 20 MIN: CPT | Performed by: COLON & RECTAL SURGERY

## 2020-02-05 RX ORDER — SODIUM CHLORIDE 9 MG/ML
125 INJECTION, SOLUTION INTRAVENOUS CONTINUOUS
Status: DISCONTINUED | OUTPATIENT
Start: 2020-02-05 | End: 2020-02-09 | Stop reason: HOSPADM

## 2020-02-05 RX ORDER — PROPOFOL 10 MG/ML
INJECTION, EMULSION INTRAVENOUS AS NEEDED
Status: DISCONTINUED | OUTPATIENT
Start: 2020-02-05 | End: 2020-02-05 | Stop reason: SURG

## 2020-02-05 RX ORDER — LIDOCAINE HYDROCHLORIDE 10 MG/ML
0.5 INJECTION, SOLUTION EPIDURAL; INFILTRATION; INTRACAUDAL; PERINEURAL ONCE AS NEEDED
Status: DISCONTINUED | OUTPATIENT
Start: 2020-02-05 | End: 2020-02-09 | Stop reason: HOSPADM

## 2020-02-05 RX ORDER — PROPOFOL 10 MG/ML
INJECTION, EMULSION INTRAVENOUS CONTINUOUS PRN
Status: DISCONTINUED | OUTPATIENT
Start: 2020-02-05 | End: 2020-02-05 | Stop reason: SURG

## 2020-02-05 RX ORDER — MONTELUKAST SODIUM 10 MG/1
10 TABLET ORAL
COMMUNITY

## 2020-02-05 RX ADMIN — PROPOFOL 90 MCG/KG/MIN: 10 INJECTION, EMULSION INTRAVENOUS at 08:08

## 2020-02-05 RX ADMIN — SODIUM CHLORIDE 125 ML/HR: 0.9 INJECTION, SOLUTION INTRAVENOUS at 07:18

## 2020-02-05 RX ADMIN — SODIUM CHLORIDE: 0.9 INJECTION, SOLUTION INTRAVENOUS at 08:03

## 2020-02-05 RX ADMIN — PROPOFOL 80 MG: 10 INJECTION, EMULSION INTRAVENOUS at 08:08

## 2020-02-05 NOTE — H&P
History and Physical   Colon and Rectal Surgery   Mervin Hubbard 72 y o  male MRN: [id number]  Unit/Bed#:  Encounter: [id number]  02/05/20   8:05 AM      CC:  History of polyps  History of Present Illness   HPI:  Mervin Hubbard is a 72 y o  male with no GI symptoms  Historical Information   Past Medical History:   Diagnosis Date    Asthma     Hypertension      History reviewed  No pertinent surgical history  Meds/Allergies       (Not in a hospital admission)      Current Outpatient Medications:     ALBUTEROL SULFATE IN, Inhale , Disp: , Rfl:     budesonide-formoterol (SYMBICORT) 160-4 5 mcg/act inhaler, Inhale 2 puffs 2 (two) times a day , Disp: , Rfl:     MINOXIDIL PO, Take 10 mg by mouth 2 (two) times a day , Disp: , Rfl:     montelukast (SINGULAIR) 10 mg tablet, Take 10 mg by mouth daily at bedtime, Disp: , Rfl:     loratadine (CLARITIN) 10 mg tablet, Take 10 mg by mouth daily  , Disp: , Rfl:     Current Facility-Administered Medications:     lidocaine (PF) (XYLOCAINE-MPF) 1 % injection 0 5 mL, 0 5 mL, Infiltration, Once PRN, Jemal Whitt MD    sodium chloride 0 9 % infusion, 125 mL/hr, Intravenous, Continuous, Jemal Whitt MD, Last Rate: 125 mL/hr at 02/05/20 0718, 125 mL/hr at 02/05/20 0718    No Known Allergies      Social History   Social History     Substance and Sexual Activity   Alcohol Use Yes    Comment: seldom     Social History     Substance and Sexual Activity   Drug Use No     Social History     Tobacco Use   Smoking Status Never Smoker   Smokeless Tobacco Never Used         Family History: History reviewed  No pertinent family history        Objective     Current Vitals:   Blood Pressure: 140/73 (02/05/20 0708)  Pulse: 78 (02/05/20 0708)  Temperature: 97 9 °F (36 6 °C) (02/05/20 0708)  Temp Source: Oral (02/05/20 0708)  Respirations: 16 (02/05/20 0708)  Weight - Scale: 93 9 kg (207 lb) (02/05/20 0708)  SpO2: 100 % (02/05/20 0708)  No intake or output data in the 24 hours ending 02/05/20 0805    Physical Exam:  General:  Well nourished, no distress  Neuro: Alert and oriented  Eyes:Sclera anicteric, conjunctiva pink  Pulm: Clear to auscultation bilaterally  No respiratory Distress  CV:  Regular rate and rhythm  No murmurs  Abdomen:  Soft, flat, non-tender, without masses or hepatosplenomegaly  Lab Results:       ASSESSMENT:  Isa Solitario is a 72 y o  male for surveillance of polyps  PLAN:  Colonoscopy  Risks , including, but not limited to, bleeding, perforation, missed lesions, and potential need for surgery, were reviewed  Alternatives to colonoscopy were discussed    Jordyn Steen MD

## 2020-02-05 NOTE — ANESTHESIA PREPROCEDURE EVALUATION
Review of Systems/Medical History  Patient summary reviewed  Chart reviewed  No history of anesthetic complications     Cardiovascular  Hypertension controlled,    Pulmonary  Asthma , well controlled/ stable ,        GI/Hepatic    Bowel prep            Endo/Other     GYN       Hematology   Musculoskeletal       Neurology   Psychology           Physical Exam    Airway    Mallampati score: I  TM Distance: >3 FB  Neck ROM: full     Dental   No notable dental hx     Cardiovascular      Pulmonary      Other Findings        Anesthesia Plan  ASA Score- 2     Anesthesia Type- IV sedation with anesthesia with ASA Monitors  Additional Monitors:   Airway Plan:         Plan Factors-  Patient did not smoke on day of surgery  Induction-     Postoperative Plan-     Informed Consent- Anesthetic plan and risks discussed with patient  I personally reviewed this patient with the CRNA  Discussed and agreed on the Anesthesia Plan with the CRNA  Connor Carrera

## 2020-02-05 NOTE — ANESTHESIA POSTPROCEDURE EVALUATION
Post-Op Assessment Note    CV Status:  Stable  Pain Score: 0    Pain management: adequate     Mental Status:  Alert and awake   Hydration Status:  Euvolemic   PONV Controlled:  Controlled   Airway Patency:  Patent   Post Op Vitals Reviewed: Yes      Staff: CRNA           BP  119/58    Temp      Pulse  87   Resp  14    SpO2  98

## 2020-06-27 PROCEDURE — 99284 EMERGENCY DEPT VISIT MOD MDM: CPT

## 2020-06-27 PROCEDURE — 99281 EMR DPT VST MAYX REQ PHY/QHP: CPT | Performed by: EMERGENCY MEDICINE

## 2020-06-28 ENCOUNTER — APPOINTMENT (EMERGENCY)
Dept: CT IMAGING | Facility: HOSPITAL | Age: 65
End: 2020-06-28
Payer: COMMERCIAL

## 2020-06-28 ENCOUNTER — HOSPITAL ENCOUNTER (EMERGENCY)
Facility: HOSPITAL | Age: 65
Discharge: HOME | End: 2020-06-28
Attending: EMERGENCY MEDICINE | Admitting: EMERGENCY MEDICINE
Payer: COMMERCIAL

## 2020-06-28 ENCOUNTER — APPOINTMENT (EMERGENCY)
Dept: RADIOLOGY | Facility: HOSPITAL | Age: 65
End: 2020-06-28
Payer: COMMERCIAL

## 2020-06-28 VITALS
WEIGHT: 201 LBS | TEMPERATURE: 97.8 F | BODY MASS INDEX: 28.43 KG/M2 | DIASTOLIC BLOOD PRESSURE: 85 MMHG | RESPIRATION RATE: 18 BRPM | HEART RATE: 88 BPM | SYSTOLIC BLOOD PRESSURE: 172 MMHG | OXYGEN SATURATION: 97 %
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
## 2020-06-28 PROCEDURE — 72125 CT NECK SPINE W/O DYE: CPT

## 2020-06-28 PROCEDURE — 70450 CT HEAD/BRAIN W/O DYE: CPT

## 2020-06-28 PROCEDURE — 73090 X-RAY EXAM OF FOREARM: CPT
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
## 2020-09-10 ENCOUNTER — TRANSCRIBE ORDERS (OUTPATIENT)
Dept: LAB | Facility: CLINIC | Age: 65
End: 2020-09-10

## 2020-09-10 DIAGNOSIS — N40.1 ENLARGED PROSTATE WITH URINARY OBSTRUCTION: Primary | ICD-10-CM

## 2020-09-10 DIAGNOSIS — N13.8 ENLARGED PROSTATE WITH URINARY OBSTRUCTION: Primary | ICD-10-CM

## 2020-09-14 ENCOUNTER — APPOINTMENT (OUTPATIENT)
Dept: LAB | Facility: CLINIC | Age: 65
End: 2020-09-14
Payer: MEDICARE

## 2020-09-14 DIAGNOSIS — N40.1 ENLARGED PROSTATE WITH URINARY OBSTRUCTION: ICD-10-CM

## 2020-09-14 DIAGNOSIS — N13.8 ENLARGED PROSTATE WITH URINARY OBSTRUCTION: ICD-10-CM

## 2020-09-14 LAB
BUN SERPL-MCNC: 20 MG/DL (ref 5–25)
CREAT SERPL-MCNC: 0.8 MG/DL (ref 0.6–1.3)
GFR SERPL CREATININE-BSD FRML MDRD: 94 ML/MIN/1.73SQ M
PSA SERPL-MCNC: 0.7 NG/ML (ref 0–4)

## 2020-09-14 PROCEDURE — G0103 PSA SCREENING: HCPCS

## 2020-09-14 PROCEDURE — 36415 COLL VENOUS BLD VENIPUNCTURE: CPT

## 2020-09-14 PROCEDURE — 82565 ASSAY OF CREATININE: CPT

## 2020-09-14 PROCEDURE — 84520 ASSAY OF UREA NITROGEN: CPT

## 2020-10-30 ENCOUNTER — HOSPITAL ENCOUNTER (OUTPATIENT)
Dept: RADIOLOGY | Facility: HOSPITAL | Age: 65
Discharge: HOME | End: 2020-10-30
Attending: INTERNAL MEDICINE
Payer: MEDICARE

## 2020-10-30 ENCOUNTER — TRANSCRIBE ORDERS (OUTPATIENT)
Dept: ADMINISTRATIVE | Facility: HOSPITAL | Age: 65
End: 2020-10-30

## 2020-10-30 DIAGNOSIS — R05.9 COUGH: Primary | ICD-10-CM

## 2020-10-30 DIAGNOSIS — I10 ESSENTIAL HYPERTENSION, MALIGNANT: ICD-10-CM

## 2020-10-30 DIAGNOSIS — R05.9 COUGH: ICD-10-CM

## 2020-10-30 DIAGNOSIS — E78.2 MIXED HYPERLIPIDEMIA: ICD-10-CM

## 2020-10-30 PROCEDURE — 71046 X-RAY EXAM CHEST 2 VIEWS: CPT

## 2020-11-21 ENCOUNTER — LAB (OUTPATIENT)
Dept: LAB | Facility: CLINIC | Age: 65
End: 2020-11-21
Payer: MEDICARE

## 2020-11-21 DIAGNOSIS — I10 ESSENTIAL HYPERTENSION, MALIGNANT: ICD-10-CM

## 2020-11-21 DIAGNOSIS — E78.2 MIXED HYPERLIPIDEMIA: ICD-10-CM

## 2020-11-21 LAB
ALBUMIN SERPL BCP-MCNC: 3.6 G/DL (ref 3.5–5)
ALP SERPL-CCNC: 47 U/L (ref 46–116)
ALT SERPL W P-5'-P-CCNC: 27 U/L (ref 12–78)
ANION GAP SERPL CALCULATED.3IONS-SCNC: 4 MMOL/L (ref 4–13)
AST SERPL W P-5'-P-CCNC: 18 U/L (ref 5–45)
BILIRUB SERPL-MCNC: 0.44 MG/DL (ref 0.2–1)
BILIRUB UR QL STRIP: NEGATIVE
BUN SERPL-MCNC: 19 MG/DL (ref 5–25)
CALCIUM SERPL-MCNC: 8.7 MG/DL (ref 8.3–10.1)
CHLORIDE SERPL-SCNC: 105 MMOL/L (ref 100–108)
CHOLEST SERPL-MCNC: 205 MG/DL (ref 50–200)
CLARITY UR: CLEAR
CO2 SERPL-SCNC: 31 MMOL/L (ref 21–32)
COLOR UR: YELLOW
CREAT SERPL-MCNC: 0.91 MG/DL (ref 0.6–1.3)
ERYTHROCYTE [DISTWIDTH] IN BLOOD BY AUTOMATED COUNT: 13 % (ref 11.6–15.1)
ERYTHROCYTE [SEDIMENTATION RATE] IN BLOOD: 8 MM/HOUR (ref 0–19)
GFR SERPL CREATININE-BSD FRML MDRD: 88 ML/MIN/1.73SQ M
GLUCOSE P FAST SERPL-MCNC: 94 MG/DL (ref 65–99)
GLUCOSE UR STRIP-MCNC: NEGATIVE MG/DL
HCT VFR BLD AUTO: 44 % (ref 36.5–49.3)
HDLC SERPL-MCNC: 56 MG/DL
HGB BLD-MCNC: 14.5 G/DL (ref 12–17)
HGB UR QL STRIP.AUTO: NEGATIVE
KETONES UR STRIP-MCNC: NEGATIVE MG/DL
LDH SERPL-CCNC: 197 U/L (ref 81–234)
LDLC SERPL CALC-MCNC: 137 MG/DL (ref 0–100)
LEUKOCYTE ESTERASE UR QL STRIP: NEGATIVE
MCH RBC QN AUTO: 28.2 PG (ref 26.8–34.3)
MCHC RBC AUTO-ENTMCNC: 33 G/DL (ref 31.4–37.4)
MCV RBC AUTO: 85 FL (ref 82–98)
NITRITE UR QL STRIP: NEGATIVE
NONHDLC SERPL-MCNC: 149 MG/DL
PH UR STRIP.AUTO: 8 [PH]
PLATELET # BLD AUTO: 291 THOUSANDS/UL (ref 149–390)
PMV BLD AUTO: 9.9 FL (ref 8.9–12.7)
POTASSIUM SERPL-SCNC: 4.6 MMOL/L (ref 3.5–5.3)
PROT SERPL-MCNC: 7.6 G/DL (ref 6.4–8.2)
PROT UR STRIP-MCNC: NEGATIVE MG/DL
PSA SERPL-MCNC: 0.8 NG/ML (ref 0–4)
RBC # BLD AUTO: 5.15 MILLION/UL (ref 3.88–5.62)
SODIUM SERPL-SCNC: 140 MMOL/L (ref 136–145)
SP GR UR STRIP.AUTO: 1.01 (ref 1–1.03)
TESTOST SERPL-MCNC: 611 NG/DL (ref 95–948)
TRIGL SERPL-MCNC: 61 MG/DL
UROBILINOGEN UR QL STRIP.AUTO: 0.2 E.U./DL
WBC # BLD AUTO: 6.66 THOUSAND/UL (ref 4.31–10.16)

## 2020-11-21 PROCEDURE — 80061 LIPID PANEL: CPT

## 2020-11-21 PROCEDURE — 84403 ASSAY OF TOTAL TESTOSTERONE: CPT

## 2020-11-21 PROCEDURE — 81003 URINALYSIS AUTO W/O SCOPE: CPT | Performed by: INTERNAL MEDICINE

## 2020-11-21 PROCEDURE — 85027 COMPLETE CBC AUTOMATED: CPT

## 2020-11-21 PROCEDURE — 80053 COMPREHEN METABOLIC PANEL: CPT

## 2020-11-21 PROCEDURE — 83615 LACTATE (LD) (LDH) ENZYME: CPT

## 2020-11-21 PROCEDURE — 84153 ASSAY OF PSA TOTAL: CPT

## 2020-11-21 PROCEDURE — 36415 COLL VENOUS BLD VENIPUNCTURE: CPT

## 2020-11-21 PROCEDURE — 85652 RBC SED RATE AUTOMATED: CPT

## 2021-02-13 DIAGNOSIS — Z23 ENCOUNTER FOR IMMUNIZATION: ICD-10-CM

## 2021-05-25 ENCOUNTER — APPOINTMENT (EMERGENCY)
Dept: RADIOLOGY | Facility: HOSPITAL | Age: 66
End: 2021-05-25
Payer: MEDICARE

## 2021-05-25 ENCOUNTER — HOSPITAL ENCOUNTER (EMERGENCY)
Facility: HOSPITAL | Age: 66
Discharge: HOME | End: 2021-05-25
Attending: EMERGENCY MEDICINE | Admitting: EMERGENCY MEDICINE
Payer: MEDICARE

## 2021-05-25 VITALS
DIASTOLIC BLOOD PRESSURE: 95 MMHG | TEMPERATURE: 98.6 F | OXYGEN SATURATION: 98 % | RESPIRATION RATE: 18 BRPM | HEART RATE: 85 BPM | SYSTOLIC BLOOD PRESSURE: 180 MMHG

## 2021-05-25 DIAGNOSIS — J18.9 CAP (COMMUNITY ACQUIRED PNEUMONIA): ICD-10-CM

## 2021-05-25 DIAGNOSIS — R07.89 ATYPICAL CHEST PAIN: Primary | ICD-10-CM

## 2021-05-25 LAB
ALBUMIN SERPL BCP-MCNC: 3.9 G/DL (ref 3.5–5)
ALP SERPL-CCNC: 54 U/L (ref 46–116)
ALT SERPL W P-5'-P-CCNC: 29 U/L (ref 12–78)
ANION GAP SERPL CALCULATED.3IONS-SCNC: 7 MMOL/L (ref 4–13)
AST SERPL W P-5'-P-CCNC: 21 U/L (ref 5–45)
ATRIAL RATE: 81 BPM
BASOPHILS # BLD AUTO: 0.05 THOUSANDS/ΜL (ref 0–0.1)
BASOPHILS NFR BLD AUTO: 1 % (ref 0–1)
BILIRUB SERPL-MCNC: 0.28 MG/DL (ref 0.2–1)
BUN SERPL-MCNC: 19 MG/DL (ref 5–25)
CALCIUM SERPL-MCNC: 8.4 MG/DL (ref 8.3–10.1)
CHLORIDE SERPL-SCNC: 104 MMOL/L (ref 100–108)
CO2 SERPL-SCNC: 27 MMOL/L (ref 21–32)
CREAT SERPL-MCNC: 0.86 MG/DL (ref 0.6–1.3)
D DIMER PPP FEU-MCNC: 0.41 UG/ML FEU
EOSINOPHIL # BLD AUTO: 0.9 THOUSAND/ΜL (ref 0–0.61)
EOSINOPHIL NFR BLD AUTO: 9 % (ref 0–6)
ERYTHROCYTE [DISTWIDTH] IN BLOOD BY AUTOMATED COUNT: 12.9 % (ref 11.6–15.1)
GFR SERPL CREATININE-BSD FRML MDRD: 90 ML/MIN/1.73SQ M
GLUCOSE SERPL-MCNC: 112 MG/DL (ref 65–140)
HCT VFR BLD AUTO: 43 % (ref 36.5–49.3)
HGB BLD-MCNC: 14.5 G/DL (ref 12–17)
IMM GRANULOCYTES # BLD AUTO: 0.04 THOUSAND/UL (ref 0–0.2)
IMM GRANULOCYTES NFR BLD AUTO: 0 % (ref 0–2)
LYMPHOCYTES # BLD AUTO: 2.15 THOUSANDS/ΜL (ref 0.6–4.47)
LYMPHOCYTES NFR BLD AUTO: 21 % (ref 14–44)
MCH RBC QN AUTO: 29.2 PG (ref 26.8–34.3)
MCHC RBC AUTO-ENTMCNC: 33.7 G/DL (ref 31.4–37.4)
MCV RBC AUTO: 87 FL (ref 82–98)
MONOCYTES # BLD AUTO: 0.56 THOUSAND/ΜL (ref 0.17–1.22)
MONOCYTES NFR BLD AUTO: 5 % (ref 4–12)
NEUTROPHILS # BLD AUTO: 6.71 THOUSANDS/ΜL (ref 1.85–7.62)
NEUTS SEG NFR BLD AUTO: 64 % (ref 43–75)
NRBC BLD AUTO-RTO: 0 /100 WBCS
P AXIS: 36 DEGREES
PLATELET # BLD AUTO: 333 THOUSANDS/UL (ref 149–390)
PMV BLD AUTO: 10.1 FL (ref 8.9–12.7)
POTASSIUM SERPL-SCNC: 3.2 MMOL/L (ref 3.5–5.3)
PR INTERVAL: 158 MS
PROT SERPL-MCNC: 8.1 G/DL (ref 6.4–8.2)
QRS AXIS: -60 DEGREES
QRSD INTERVAL: 106 MS
QT INTERVAL: 392 MS
QTC INTERVAL: 444 MS
RBC # BLD AUTO: 4.97 MILLION/UL (ref 3.88–5.62)
SODIUM SERPL-SCNC: 138 MMOL/L (ref 136–145)
T WAVE AXIS: 39 DEGREES
TROPONIN I SERPL-MCNC: <0.02 NG/ML
TROPONIN I SERPL-MCNC: <0.02 NG/ML
VENTRICULAR RATE: 77 BPM
WBC # BLD AUTO: 10.41 THOUSAND/UL (ref 4.31–10.16)

## 2021-05-25 PROCEDURE — 99285 EMERGENCY DEPT VISIT HI MDM: CPT | Performed by: EMERGENCY MEDICINE

## 2021-05-25 PROCEDURE — 99285 EMERGENCY DEPT VISIT HI MDM: CPT

## 2021-05-25 PROCEDURE — 85379 FIBRIN DEGRADATION QUANT: CPT | Performed by: EMERGENCY MEDICINE

## 2021-05-25 PROCEDURE — 93010 ELECTROCARDIOGRAM REPORT: CPT | Performed by: INTERNAL MEDICINE

## 2021-05-25 PROCEDURE — 71045 X-RAY EXAM CHEST 1 VIEW: CPT

## 2021-05-25 PROCEDURE — 93005 ELECTROCARDIOGRAM TRACING: CPT

## 2021-05-25 PROCEDURE — 80053 COMPREHEN METABOLIC PANEL: CPT | Performed by: EMERGENCY MEDICINE

## 2021-05-25 PROCEDURE — 36415 COLL VENOUS BLD VENIPUNCTURE: CPT

## 2021-05-25 PROCEDURE — 84484 ASSAY OF TROPONIN QUANT: CPT | Performed by: EMERGENCY MEDICINE

## 2021-05-25 PROCEDURE — 85025 COMPLETE CBC W/AUTO DIFF WBC: CPT | Performed by: EMERGENCY MEDICINE

## 2021-05-25 RX ORDER — DOXYCYCLINE HYCLATE 100 MG/1
100 CAPSULE ORAL EVERY 12 HOURS SCHEDULED
Qty: 10 CAPSULE | Refills: 0 | Status: SHIPPED | OUTPATIENT
Start: 2021-05-25 | End: 2021-05-30

## 2021-05-25 NOTE — ED PROVIDER NOTES
History  Chief Complaint   Patient presents with    Chest Pain     pt reports ongoing cough since january  started coughing up blood clots today, c/o chest pain     HPI     24-year-old male with history of asthma and hypertension presenting for evaluation of cough, chest tightness, and blood-streaked sputum  Patient states he was treated for pneumonia with an unknown antibiotic in October  He felt like his cough got better, but then in January again developed a cough productive of yellow sputum  Since that time he has had daily cough productive of yellow sputum without significant shortness of breath or fevers  Reports an occasional tightness in the middle of his chest that does not radiate  This morning he noticed streaks of blood in his sputum  Denies history of DVT or PE  No calf swelling or tenderness  No nausea, vomiting, or diarrhea  Prior to Admission Medications   Prescriptions Last Dose Informant Patient Reported? Taking? ALBUTEROL SULFATE IN 5/25/2021 at Unknown time  Yes Yes   Sig: Inhale  MINOXIDIL PO  Self Yes No   Sig: Take 10 mg by mouth 2 (two) times a day    budesonide-formoterol (SYMBICORT) 160-4 5 mcg/act inhaler   Yes No   Sig: Inhale 2 puffs 2 (two) times a day  loratadine (CLARITIN) 10 mg tablet   Yes No   Sig: Take 10 mg by mouth daily  montelukast (SINGULAIR) 10 mg tablet  Self Yes No   Sig: Take 10 mg by mouth daily at bedtime      Facility-Administered Medications: None       Past Medical History:   Diagnosis Date    Asthma     Hypertension        History reviewed  No pertinent surgical history  History reviewed  No pertinent family history  I have reviewed and agree with the history as documented      E-Cigarette/Vaping     E-Cigarette/Vaping Substances     Social History     Tobacco Use    Smoking status: Never Smoker    Smokeless tobacco: Never Used   Substance Use Topics    Alcohol use: Yes     Comment: seldom    Drug use: No       Review of Systems Constitutional: Negative for chills and fever  HENT: Negative for congestion  Eyes: Negative for visual disturbance  Respiratory: Positive for cough  Negative for shortness of breath  Cardiovascular: Positive for chest pain  Negative for leg swelling  Gastrointestinal: Negative for abdominal pain, diarrhea, nausea and vomiting  Genitourinary: Negative for dysuria and frequency  Musculoskeletal: Negative for arthralgias, back pain, neck pain and neck stiffness  Skin: Negative for rash  Neurological: Negative for weakness, numbness and headaches  Psychiatric/Behavioral: Negative for agitation, behavioral problems and confusion  Physical Exam  Physical Exam  Constitutional:       General: He is not in acute distress  Appearance: He is well-developed  He is not diaphoretic  HENT:      Head: Normocephalic and atraumatic  Right Ear: External ear normal       Left Ear: External ear normal       Nose: Nose normal    Eyes:      Conjunctiva/sclera: Conjunctivae normal    Neck:      Musculoskeletal: Normal range of motion and neck supple  Cardiovascular:      Rate and Rhythm: Normal rate and regular rhythm  Pulses: Normal pulses  Heart sounds: Normal heart sounds  No murmur  No friction rub  No gallop  Pulmonary:      Effort: Pulmonary effort is normal  No respiratory distress  Breath sounds: Normal breath sounds  No wheezing or rales  Abdominal:      General: Bowel sounds are normal  There is no distension  Palpations: Abdomen is soft  Tenderness: There is no abdominal tenderness  There is no guarding  Musculoskeletal: Normal range of motion  General: No deformity  Right lower leg: No edema  Left lower leg: No edema  Comments: No calf swelling or tenderness   Skin:     General: Skin is warm and dry  Neurological:      Mental Status: He is alert and oriented to person, place, and time  Motor: No abnormal muscle tone  Psychiatric:         Mood and Affect: Mood normal          Vital Signs  ED Triage Vitals   Temperature Pulse Respirations Blood Pressure SpO2   05/25/21 1458 05/25/21 1452 05/25/21 1452 05/25/21 1452 05/25/21 1452   98 6 °F (37 °C) 85 18 (!) 180/95 98 %      Temp Source Heart Rate Source Patient Position - Orthostatic VS BP Location FiO2 (%)   05/25/21 1458 -- -- -- --   Oral          Pain Score       --                  Vitals:    05/25/21 1452   BP: (!) 180/95   Pulse: 85         Visual Acuity      ED Medications  Medications - No data to display    Diagnostic Studies  Results Reviewed     Procedure Component Value Units Date/Time    Troponin I [[id number]]  (Normal) Collected: 05/25/21 1758    Lab Status: Final result Specimen: Blood from Arm, Left Updated: 05/25/21 1825     Troponin I <0 02 ng/mL     D-dimer, quantitative [[id number]]  (Normal) Collected: 05/25/21 1458    Lab Status: Final result Specimen: Blood from Arm, Right Updated: 05/25/21 1716     D-Dimer, Quant 0 41 ug/ml FEU     Comprehensive metabolic panel [[id number]]  (Abnormal) Collected: 05/25/21 1458    Lab Status: Final result Specimen: Blood from Arm, Right Updated: 05/25/21 1552     Sodium 138 mmol/L      Potassium 3 2 mmol/L      Chloride 104 mmol/L      CO2 27 mmol/L      ANION GAP 7 mmol/L      BUN 19 mg/dL      Creatinine 0 86 mg/dL      Glucose 112 mg/dL      Calcium 8 4 mg/dL      AST 21 U/L      ALT 29 U/L      Alkaline Phosphatase 54 U/L      Total Protein 8 1 g/dL      Albumin 3 9 g/dL      Total Bilirubin 0 28 mg/dL      eGFR 90 ml/min/1 73sq m     Narrative:      Yoel guidelines for Chronic Kidney Disease (CKD):     Stage 1 with normal or high GFR (GFR > 90 mL/min/1 73 square meters)    Stage 2 Mild CKD (GFR = 60-89 mL/min/1 73 square meters)    Stage 3A Moderate CKD (GFR = 45-59 mL/min/1 73 square meters)    Stage 3B Moderate CKD (GFR = 30-44 mL/min/1 73 square meters)    Stage 4 Severe CKD (GFR = 15-29 mL/min/1 73 square meters)    Stage 5 End Stage CKD (GFR <15 mL/min/1 73 square meters)  Note: GFR calculation is accurate only with a steady state creatinine    Troponin I [[id number]]  (Normal) Collected: 05/25/21 1458    Lab Status: Final result Specimen: Blood from Arm, Right Updated: 05/25/21 1529     Troponin I <0 02 ng/mL     CBC and differential [[id number]]  (Abnormal) Collected: 05/25/21 1458    Lab Status: Final result Specimen: Blood from Arm, Right Updated: 05/25/21 1508     WBC 10 41 Thousand/uL      RBC 4 97 Million/uL      Hemoglobin 14 5 g/dL      Hematocrit 43 0 %      MCV 87 fL      MCH 29 2 pg      MCHC 33 7 g/dL      RDW 12 9 %      MPV 10 1 fL      Platelets 079 Thousands/uL      nRBC 0 /100 WBCs      Neutrophils Relative 64 %      Immat GRANS % 0 %      Lymphocytes Relative 21 %      Monocytes Relative 5 %      Eosinophils Relative 9 %      Basophils Relative 1 %      Neutrophils Absolute 6 71 Thousands/µL      Immature Grans Absolute 0 04 Thousand/uL      Lymphocytes Absolute 2 15 Thousands/µL      Monocytes Absolute 0 56 Thousand/µL      Eosinophils Absolute 0 90 Thousand/µL      Basophils Absolute 0 05 Thousands/µL                  XR chest 1 view portable   ED Interpretation by Benita Reinoso MD (05/25 1610)   Infiltrate vs atelectasis to the RLL, new from prior                 Procedures  Procedures         ED Course             HEART Risk Score      Most Recent Value   Heart Score Risk Calculator   History  0 Filed at: 05/25/2021 1832   ECG  1 Filed at: 05/25/2021 1832   Age  1 Filed at: 05/25/2021 1832   Risk Factors  1 Filed at: 05/25/2021 1832   Troponin  0 Filed at: 05/25/2021 1832   HEART Score  3 Filed at: 05/25/2021 1832                          Wells' Criteria for PE      Most Recent Value   Wells' Criteria for PE   Clinical signs and symptoms of DVT  0 Filed at: 05/25/2021 5780   PE is primary diagnosis or equally likely  0 Filed at: 05/25/2021 1833   HR >100  0 Filed at: 05/25/2021 1833   Immobilization at least 3 days or Surgery in the previous 4 weeks  0 Filed at: 05/25/2021 1833   Previous, objectively diagnosed PE or DVT  0 Filed at: 05/25/2021 1833   Hemoptysis  1 Filed at: 05/25/2021 1833   Malignancy with treatment within 6 months or palliative  0 Filed at: 05/25/2021 0145   Wells' Criteria Total  1 Filed at: 05/25/2021 1833                MDM  Number of Diagnoses or Management Options  Atypical chest pain: new and requires workup  CAP (community acquired pneumonia): new and requires workup  Diagnosis management comments: Generally well appearing  Afebrile and hemodynamically stable though hypertensive to 180/95  I personally interpreted the patient's EKG which reveals normal rate, normal sinus rhythm, left axis deviation unchanged from prior, incomplete right bundle-branch block unchanged from prior, Q-waves in the inferior leads and lead V3 similar to prior with poor R-wave progression, unchanged from most recent prior EKG  Delta troponins undetectable  Patient is low risk by heart score, doubt ischemia  Description of symptoms not consistent with aortic dissection  Low risk by Wells criteria, D-dimer not elevated, doubt PE  Chest x-ray with infiltrate versus atelectasis to the right lower lobe  Will treat with course of doxycycline for CAP  Recommend follow-up with his doctor in 1 week for recheck  Return precautions discussed         Amount and/or Complexity of Data Reviewed  Clinical lab tests: ordered and reviewed  Tests in the radiology section of CPT®: reviewed and ordered  Review and summarize past medical records: yes  Independent visualization of images, tracings, or specimens: yes    Patient Progress  Patient progress: stable           Disposition  Final diagnoses:   Atypical chest pain   CAP (community acquired pneumonia)     Time reflects when diagnosis was documented in both MDM as applicable and the Disposition within this note     Time User Action Codes Description Comment    5/25/2021  6:32 PM Jason Cardinal Yeager [R07 89] Atypical chest pain     5/25/2021  6:32 PM Jason Yeager [J18 9] CAP (community acquired pneumonia)       ED Disposition     ED Disposition Condition Date/Time Comment    Discharge Stable Tue May 25, 2021  6:32 PM [facility] discharge to home/self care  Follow-up Information     Follow up With Specialties Details Why Contact Info Additional Information    Rocky Tapia MD Internal Medicine In 1 week For re-evaluation if your cough has not improved  [address] [facility]  [address]  [address] [place] [address]  [facility] Emergency Department Emergency Medicine  As we discussed, return to the Emergency Department immediately for coughing up large amounts of blood, trouble breathing, chest pain, fever, or for new or concerning symptoms  [address] [facility] [id number] [place] [address] Emergency Department, [address], [place], [place], [address]          Discharge Medication List as of 5/25/2021  6:34 PM      START taking these medications    Details   doxycycline hyclate (VIBRAMYCIN) 100 mg capsule Take 1 capsule (100 mg total) by mouth every 12 (twelve) hours for 5 days, Starting Tue 5/25/2021, Until Sun 5/30/2021, Normal         CONTINUE these medications which have NOT CHANGED    Details   ALBUTEROL SULFATE IN Inhale , Until Discontinued, Historical Med      budesonide-formoterol (SYMBICORT) 160-4 5 mcg/act inhaler Inhale 2 puffs 2 (two) times a day , Until Discontinued, Historical Med      loratadine (CLARITIN) 10 mg tablet Take 10 mg by mouth daily  , Until Discontinued, Historical Med      MINOXIDIL PO Take 10 mg by mouth 2 (two) times a day , Historical Med      montelukast (SINGULAIR) 10 mg tablet Take 10 mg by mouth daily at bedtime, Historical Med           No discharge procedures on file     PDMP Review     None          ED Provider  Electronically Signed by           Jojo Yip MD  05/26/21 0116

## 2021-06-11 ENCOUNTER — APPOINTMENT (OUTPATIENT)
Dept: LAB | Facility: CLINIC | Age: 66
End: 2021-06-11
Payer: MEDICARE

## 2021-06-11 ENCOUNTER — TRANSCRIBE ORDERS (OUTPATIENT)
Dept: ADMINISTRATIVE | Facility: HOSPITAL | Age: 66
End: 2021-06-11

## 2021-06-11 DIAGNOSIS — R04.2 SPITTING UP BLOOD: ICD-10-CM

## 2021-06-11 DIAGNOSIS — R04.2 COUGHING UP BLOOD: ICD-10-CM

## 2021-06-11 DIAGNOSIS — J44.9 OBSTRUCTIVE CHRONIC BRONCHITIS WITHOUT EXACERBATION (HCC): Primary | ICD-10-CM

## 2021-06-14 ENCOUNTER — HOSPITAL ENCOUNTER (OUTPATIENT)
Dept: CT IMAGING | Facility: HOSPITAL | Age: 66
Discharge: HOME | End: 2021-06-14
Attending: INTERNAL MEDICINE
Payer: MEDICARE

## 2021-06-14 DIAGNOSIS — R04.2 COUGHING UP BLOOD: ICD-10-CM

## 2021-06-14 PROCEDURE — 71250 CT THORAX DX C-: CPT

## 2021-06-17 ENCOUNTER — APPOINTMENT (OUTPATIENT)
Dept: LAB | Facility: CLINIC | Age: 66
End: 2021-06-17
Payer: MEDICARE

## 2021-06-17 DIAGNOSIS — J44.9 OBSTRUCTIVE CHRONIC BRONCHITIS WITHOUT EXACERBATION (HCC): ICD-10-CM

## 2021-06-17 PROCEDURE — 36415 COLL VENOUS BLD VENIPUNCTURE: CPT

## 2021-09-16 ENCOUNTER — APPOINTMENT (OUTPATIENT)
Dept: LAB | Facility: CLINIC | Age: 66
End: 2021-09-16
Payer: MEDICARE

## 2021-09-16 DIAGNOSIS — N40.1 ENLARGED PROSTATE WITH URINARY OBSTRUCTION: ICD-10-CM

## 2021-09-16 DIAGNOSIS — E29.1 3-OXO-5 ALPHA-STEROID DELTA 4-DEHYDROGENASE DEFICIENCY: ICD-10-CM

## 2021-09-16 DIAGNOSIS — N13.8 ENLARGED PROSTATE WITH URINARY OBSTRUCTION: ICD-10-CM

## 2021-09-16 LAB
BUN SERPL-MCNC: 20 MG/DL (ref 5–25)
CREAT SERPL-MCNC: 0.97 MG/DL (ref 0.6–1.3)
GFR SERPL CREATININE-BSD FRML MDRD: 81 ML/MIN/1.73SQ M
PSA SERPL-MCNC: 0.9 NG/ML (ref 0–4)

## 2021-09-16 PROCEDURE — 82565 ASSAY OF CREATININE: CPT

## 2021-09-16 PROCEDURE — 36415 COLL VENOUS BLD VENIPUNCTURE: CPT

## 2021-09-16 PROCEDURE — 84153 ASSAY OF PSA TOTAL: CPT

## 2021-09-16 PROCEDURE — 84520 ASSAY OF UREA NITROGEN: CPT

## 2021-09-24 ENCOUNTER — APPOINTMENT (OUTPATIENT)
Dept: LAB | Facility: CLINIC | Age: 66
End: 2021-09-24
Payer: MEDICARE

## 2021-09-24 DIAGNOSIS — Z20.822 ENCOUNTER FOR SCREENING LABORATORY TESTING FOR SEVERE ACUTE RESPIRATORY SYNDROME CORONAVIRUS 2 (SARS-COV-2): ICD-10-CM

## 2021-09-24 LAB
SARS-COV-2 IGG SERPL QL IA: REACTIVE
SARS-COV-2 IGG+IGM SERPL QL IA: REACTIVE

## 2021-09-24 PROCEDURE — 86769 SARS-COV-2 COVID-19 ANTIBODY: CPT

## 2021-09-24 PROCEDURE — 36415 COLL VENOUS BLD VENIPUNCTURE: CPT

## 2021-10-27 ENCOUNTER — HOSPITAL ENCOUNTER (EMERGENCY)
Facility: HOSPITAL | Age: 66
Discharge: HOME | End: 2021-10-27
Attending: EMERGENCY MEDICINE
Payer: MEDICARE

## 2021-10-27 VITALS
OXYGEN SATURATION: 95 % | TEMPERATURE: 99.5 F | SYSTOLIC BLOOD PRESSURE: 147 MMHG | RESPIRATION RATE: 18 BRPM | DIASTOLIC BLOOD PRESSURE: 76 MMHG | HEART RATE: 102 BPM

## 2021-10-27 DIAGNOSIS — R11.2 NAUSEA AND VOMITING: Primary | ICD-10-CM

## 2021-10-27 LAB
ALBUMIN SERPL BCP-MCNC: 3.8 G/DL (ref 3.5–5)
ALP SERPL-CCNC: 50 U/L (ref 46–116)
ALT SERPL W P-5'-P-CCNC: 30 U/L (ref 12–78)
ANION GAP SERPL CALCULATED.3IONS-SCNC: 10 MMOL/L (ref 4–13)
AST SERPL W P-5'-P-CCNC: 21 U/L (ref 5–45)
BASOPHILS # BLD MANUAL: 0 THOUSAND/UL (ref 0–0.1)
BASOPHILS NFR MAR MANUAL: 0 % (ref 0–1)
BILIRUB SERPL-MCNC: 0.46 MG/DL (ref 0.2–1)
BUN SERPL-MCNC: 25 MG/DL (ref 5–25)
CALCIUM SERPL-MCNC: 8.2 MG/DL (ref 8.3–10.1)
CHLORIDE SERPL-SCNC: 102 MMOL/L (ref 100–108)
CO2 SERPL-SCNC: 28 MMOL/L (ref 21–32)
CREAT SERPL-MCNC: 1.1 MG/DL (ref 0.6–1.3)
EOSINOPHIL # BLD MANUAL: 0 THOUSAND/UL (ref 0–0.4)
EOSINOPHIL NFR BLD MANUAL: 0 % (ref 0–6)
ERYTHROCYTE [DISTWIDTH] IN BLOOD BY AUTOMATED COUNT: 12.7 % (ref 11.6–15.1)
GAS + CO PNL BLDA: 0.8 % (ref 0–1.5)
GFR SERPL CREATININE-BSD FRML MDRD: 70 ML/MIN/1.73SQ M
GLUCOSE SERPL-MCNC: 127 MG/DL (ref 65–140)
HCT VFR BLD AUTO: 46.2 % (ref 36.5–49.3)
HGB BLD-MCNC: 15.3 G/DL (ref 12–17)
LIPASE SERPL-CCNC: 87 U/L (ref 73–393)
LYMPHOCYTES # BLD AUTO: 1.02 THOUSAND/UL (ref 0.6–4.47)
LYMPHOCYTES # BLD AUTO: 8 % (ref 14–44)
MCH RBC QN AUTO: 28 PG (ref 26.8–34.3)
MCHC RBC AUTO-ENTMCNC: 33.1 G/DL (ref 31.4–37.4)
MCV RBC AUTO: 85 FL (ref 82–98)
MONOCYTES # BLD AUTO: 0.13 THOUSAND/UL (ref 0–1.22)
MONOCYTES NFR BLD: 1 % (ref 4–12)
NEUTROPHILS # BLD MANUAL: 11.58 THOUSAND/UL (ref 1.85–7.62)
NEUTS BAND NFR BLD MANUAL: 6 % (ref 0–8)
NEUTS SEG NFR BLD AUTO: 85 % (ref 43–75)
PLATELET # BLD AUTO: 297 THOUSANDS/UL (ref 149–390)
PLATELET BLD QL SMEAR: ADEQUATE
PMV BLD AUTO: 10 FL (ref 8.9–12.7)
POTASSIUM SERPL-SCNC: 4.4 MMOL/L (ref 3.5–5.3)
PROT SERPL-MCNC: 7.9 G/DL (ref 6.4–8.2)
RBC # BLD AUTO: 5.47 MILLION/UL (ref 3.88–5.62)
RBC MORPH BLD: NORMAL
SODIUM SERPL-SCNC: 140 MMOL/L (ref 136–145)
WBC # BLD AUTO: 12.73 THOUSAND/UL (ref 4.31–10.16)

## 2021-10-27 PROCEDURE — 83690 ASSAY OF LIPASE: CPT | Performed by: EMERGENCY MEDICINE

## 2021-10-27 PROCEDURE — 99285 EMERGENCY DEPT VISIT HI MDM: CPT | Performed by: EMERGENCY MEDICINE

## 2021-10-27 PROCEDURE — 85007 BL SMEAR W/DIFF WBC COUNT: CPT | Performed by: EMERGENCY MEDICINE

## 2021-10-27 PROCEDURE — 36415 COLL VENOUS BLD VENIPUNCTURE: CPT | Performed by: EMERGENCY MEDICINE

## 2021-10-27 PROCEDURE — 99283 EMERGENCY DEPT VISIT LOW MDM: CPT

## 2021-10-27 PROCEDURE — 80053 COMPREHEN METABOLIC PANEL: CPT | Performed by: EMERGENCY MEDICINE

## 2021-10-27 PROCEDURE — 82375 ASSAY CARBOXYHB QUANT: CPT | Performed by: EMERGENCY MEDICINE

## 2021-10-27 PROCEDURE — 93005 ELECTROCARDIOGRAM TRACING: CPT

## 2021-10-27 PROCEDURE — 96374 THER/PROPH/DIAG INJ IV PUSH: CPT

## 2021-10-27 PROCEDURE — 85027 COMPLETE CBC AUTOMATED: CPT | Performed by: EMERGENCY MEDICINE

## 2021-10-27 PROCEDURE — 96361 HYDRATE IV INFUSION ADD-ON: CPT

## 2021-10-27 RX ORDER — ONDANSETRON 4 MG/1
4 TABLET, ORALLY DISINTEGRATING ORAL EVERY 6 HOURS PRN
Qty: 20 TABLET | Refills: 0 | Status: SHIPPED | OUTPATIENT
Start: 2021-10-27

## 2021-10-27 RX ORDER — ONDANSETRON 2 MG/ML
4 INJECTION INTRAMUSCULAR; INTRAVENOUS ONCE
Status: COMPLETED | OUTPATIENT
Start: 2021-10-27 | End: 2021-10-27

## 2021-10-27 RX ADMIN — SODIUM CHLORIDE 1000 ML: 0.9 INJECTION, SOLUTION INTRAVENOUS at 11:38

## 2021-10-27 RX ADMIN — ONDANSETRON 4 MG: 2 INJECTION INTRAMUSCULAR; INTRAVENOUS at 11:38

## 2021-10-29 LAB
ATRIAL RATE: 106 BPM
P AXIS: 26 DEGREES
PR INTERVAL: 146 MS
QRS AXIS: -73 DEGREES
QRSD INTERVAL: 102 MS
QT INTERVAL: 340 MS
QTC INTERVAL: 451 MS
T WAVE AXIS: 52 DEGREES
VENTRICULAR RATE: 106 BPM

## 2021-10-29 PROCEDURE — 93010 ELECTROCARDIOGRAM REPORT: CPT | Performed by: INTERNAL MEDICINE

## 2022-04-13 ENCOUNTER — APPOINTMENT (OUTPATIENT)
Dept: LAB | Facility: CLINIC | Age: 67
End: 2022-04-13
Payer: MEDICARE

## 2022-04-13 DIAGNOSIS — I10 ESSENTIAL HYPERTENSION, MALIGNANT: ICD-10-CM

## 2022-04-13 DIAGNOSIS — E29.1 3-OXO-5 ALPHA-STEROID DELTA 4-DEHYDROGENASE DEFICIENCY: ICD-10-CM

## 2022-04-13 DIAGNOSIS — N13.8 ENLARGED PROSTATE WITH URINARY OBSTRUCTION: ICD-10-CM

## 2022-04-13 DIAGNOSIS — E78.2 MIXED HYPERLIPIDEMIA: ICD-10-CM

## 2022-04-13 DIAGNOSIS — N40.0 BENIGN PROSTATIC HYPERPLASIA, UNSPECIFIED WHETHER LOWER URINARY TRACT SYMPTOMS PRESENT: ICD-10-CM

## 2022-04-13 DIAGNOSIS — N40.1 ENLARGED PROSTATE WITH URINARY OBSTRUCTION: ICD-10-CM

## 2022-04-13 LAB
ALBUMIN SERPL BCP-MCNC: 3.6 G/DL (ref 3.5–5)
ALP SERPL-CCNC: 48 U/L (ref 46–116)
ALT SERPL W P-5'-P-CCNC: 27 U/L (ref 12–78)
ANION GAP SERPL CALCULATED.3IONS-SCNC: 6 MMOL/L (ref 4–13)
AST SERPL W P-5'-P-CCNC: 19 U/L (ref 5–45)
BASOPHILS # BLD AUTO: 0.03 THOUSANDS/ΜL (ref 0–0.1)
BASOPHILS NFR BLD AUTO: 1 % (ref 0–1)
BILIRUB SERPL-MCNC: 0.49 MG/DL (ref 0.2–1)
BUN SERPL-MCNC: 15 MG/DL (ref 5–25)
CALCIUM SERPL-MCNC: 9 MG/DL (ref 8.3–10.1)
CHLORIDE SERPL-SCNC: 101 MMOL/L (ref 100–108)
CHOLEST SERPL-MCNC: 185 MG/DL
CO2 SERPL-SCNC: 30 MMOL/L (ref 21–32)
CREAT SERPL-MCNC: 0.91 MG/DL (ref 0.6–1.3)
EOSINOPHIL # BLD AUTO: 0.25 THOUSAND/ΜL (ref 0–0.61)
EOSINOPHIL NFR BLD AUTO: 4 % (ref 0–6)
ERYTHROCYTE [DISTWIDTH] IN BLOOD BY AUTOMATED COUNT: 12.9 % (ref 11.6–15.1)
ERYTHROCYTE [SEDIMENTATION RATE] IN BLOOD: 20 MM/HOUR (ref 0–19)
GFR SERPL CREATININE-BSD FRML MDRD: 86 ML/MIN/1.73SQ M
GLUCOSE P FAST SERPL-MCNC: 94 MG/DL (ref 65–99)
HCT VFR BLD AUTO: 41.8 % (ref 36.5–49.3)
HDLC SERPL-MCNC: 53 MG/DL
HGB BLD-MCNC: 13.5 G/DL (ref 12–17)
IMM GRANULOCYTES # BLD AUTO: 0.02 THOUSAND/UL (ref 0–0.2)
IMM GRANULOCYTES NFR BLD AUTO: 0 % (ref 0–2)
LDLC SERPL CALC-MCNC: 114 MG/DL (ref 0–100)
LYMPHOCYTES # BLD AUTO: 1.21 THOUSANDS/ΜL (ref 0.6–4.47)
LYMPHOCYTES NFR BLD AUTO: 20 % (ref 14–44)
MCH RBC QN AUTO: 27.2 PG (ref 26.8–34.3)
MCHC RBC AUTO-ENTMCNC: 32.3 G/DL (ref 31.4–37.4)
MCV RBC AUTO: 84 FL (ref 82–98)
MONOCYTES # BLD AUTO: 0.46 THOUSAND/ΜL (ref 0.17–1.22)
MONOCYTES NFR BLD AUTO: 8 % (ref 4–12)
NEUTROPHILS # BLD AUTO: 4.12 THOUSANDS/ΜL (ref 1.85–7.62)
NEUTS SEG NFR BLD AUTO: 67 % (ref 43–75)
NONHDLC SERPL-MCNC: 132 MG/DL
NRBC BLD AUTO-RTO: 0 /100 WBCS
PLATELET # BLD AUTO: 291 THOUSANDS/UL (ref 149–390)
PMV BLD AUTO: 9.8 FL (ref 8.9–12.7)
POTASSIUM SERPL-SCNC: 3.9 MMOL/L (ref 3.5–5.3)
PROT SERPL-MCNC: 7.7 G/DL (ref 6.4–8.2)
PSA SERPL-MCNC: 0.6 NG/ML (ref 0–4)
RBC # BLD AUTO: 4.97 MILLION/UL (ref 3.88–5.62)
SODIUM SERPL-SCNC: 137 MMOL/L (ref 136–145)
TRIGL SERPL-MCNC: 88 MG/DL
TSH SERPL DL<=0.05 MIU/L-ACNC: 1.3 UIU/ML (ref 0.45–4.5)
WBC # BLD AUTO: 6.09 THOUSAND/UL (ref 4.31–10.16)

## 2022-04-13 PROCEDURE — 84443 ASSAY THYROID STIM HORMONE: CPT

## 2022-04-13 PROCEDURE — 84153 ASSAY OF PSA TOTAL: CPT

## 2022-04-13 PROCEDURE — 36415 COLL VENOUS BLD VENIPUNCTURE: CPT

## 2022-04-13 PROCEDURE — 80061 LIPID PANEL: CPT

## 2022-04-13 PROCEDURE — 85652 RBC SED RATE AUTOMATED: CPT

## 2022-04-13 PROCEDURE — 85025 COMPLETE CBC W/AUTO DIFF WBC: CPT

## 2022-04-13 PROCEDURE — 80053 COMPREHEN METABOLIC PANEL: CPT

## 2022-04-26 LAB — CFTR MUT ANL BLD/T: NORMAL

## 2022-12-15 ENCOUNTER — APPOINTMENT (OUTPATIENT)
Dept: LAB | Facility: CLINIC | Age: 67
End: 2022-12-15

## 2022-12-15 DIAGNOSIS — Z01.812 PRE-OPERATIVE LABORATORY EXAMINATION: ICD-10-CM

## 2022-12-15 LAB
BUN SERPL-MCNC: 22 MG/DL (ref 5–25)
CREAT SERPL-MCNC: 0.92 MG/DL (ref 0.6–1.3)
GFR SERPL CREATININE-BSD FRML MDRD: 85 ML/MIN/1.73SQ M

## 2023-03-29 ENCOUNTER — APPOINTMENT (OUTPATIENT)
Dept: LAB | Facility: CLINIC | Age: 68
End: 2023-03-29

## 2023-03-29 DIAGNOSIS — N40.0 BENIGN PROSTATIC HYPERPLASIA, UNSPECIFIED WHETHER LOWER URINARY TRACT SYMPTOMS PRESENT: ICD-10-CM

## 2023-03-29 DIAGNOSIS — E78.2 MIXED HYPERLIPIDEMIA: ICD-10-CM

## 2023-03-29 DIAGNOSIS — I10 ESSENTIAL HYPERTENSION, MALIGNANT: ICD-10-CM

## 2023-03-29 LAB
ALBUMIN SERPL BCP-MCNC: 4 G/DL (ref 3.5–5)
ALP SERPL-CCNC: 45 U/L (ref 34–104)
ALT SERPL W P-5'-P-CCNC: 25 U/L (ref 7–52)
ANION GAP SERPL CALCULATED.3IONS-SCNC: 5 MMOL/L (ref 4–13)
AST SERPL W P-5'-P-CCNC: 19 U/L (ref 13–39)
BASOPHILS # BLD AUTO: 0.03 THOUSANDS/ÂΜL (ref 0–0.1)
BASOPHILS NFR BLD AUTO: 0 % (ref 0–1)
BILIRUB SERPL-MCNC: 0.68 MG/DL (ref 0.2–1)
BUN SERPL-MCNC: 22 MG/DL (ref 5–25)
CALCIUM SERPL-MCNC: 9.1 MG/DL (ref 8.4–10.2)
CHLORIDE SERPL-SCNC: 105 MMOL/L (ref 96–108)
CHOLEST SERPL-MCNC: 200 MG/DL
CO2 SERPL-SCNC: 27 MMOL/L (ref 21–32)
CREAT SERPL-MCNC: 0.78 MG/DL (ref 0.6–1.3)
EOSINOPHIL # BLD AUTO: 0.2 THOUSAND/ÂΜL (ref 0–0.61)
EOSINOPHIL NFR BLD AUTO: 3 % (ref 0–6)
ERYTHROCYTE [DISTWIDTH] IN BLOOD BY AUTOMATED COUNT: 13.2 % (ref 11.6–15.1)
ERYTHROCYTE [SEDIMENTATION RATE] IN BLOOD: 13 MM/HOUR (ref 0–19)
GFR SERPL CREATININE-BSD FRML MDRD: 92 ML/MIN/1.73SQ M
GLUCOSE P FAST SERPL-MCNC: 83 MG/DL (ref 65–99)
HCT VFR BLD AUTO: 41.7 % (ref 36.5–49.3)
HDLC SERPL-MCNC: 53 MG/DL
HGB BLD-MCNC: 13.8 G/DL (ref 12–17)
IMM GRANULOCYTES # BLD AUTO: 0.03 THOUSAND/UL (ref 0–0.2)
IMM GRANULOCYTES NFR BLD AUTO: 0 % (ref 0–2)
LDLC SERPL CALC-MCNC: 128 MG/DL (ref 0–100)
LYMPHOCYTES # BLD AUTO: 1.58 THOUSANDS/ÂΜL (ref 0.6–4.47)
LYMPHOCYTES NFR BLD AUTO: 22 % (ref 14–44)
MCH RBC QN AUTO: 27.5 PG (ref 26.8–34.3)
MCHC RBC AUTO-ENTMCNC: 33.1 G/DL (ref 31.4–37.4)
MCV RBC AUTO: 83 FL (ref 82–98)
MONOCYTES # BLD AUTO: 0.47 THOUSAND/ÂΜL (ref 0.17–1.22)
MONOCYTES NFR BLD AUTO: 7 % (ref 4–12)
NEUTROPHILS # BLD AUTO: 4.76 THOUSANDS/ÂΜL (ref 1.85–7.62)
NEUTS SEG NFR BLD AUTO: 68 % (ref 43–75)
NONHDLC SERPL-MCNC: 147 MG/DL
NRBC BLD AUTO-RTO: 0 /100 WBCS
PLATELET # BLD AUTO: 273 THOUSANDS/UL (ref 149–390)
PMV BLD AUTO: 10.2 FL (ref 8.9–12.7)
POTASSIUM SERPL-SCNC: 3.8 MMOL/L (ref 3.5–5.3)
PROT SERPL-MCNC: 7.2 G/DL (ref 6.4–8.4)
PSA SERPL-MCNC: 0.6 NG/ML (ref 0–4)
RBC # BLD AUTO: 5.01 MILLION/UL (ref 3.88–5.62)
SODIUM SERPL-SCNC: 137 MMOL/L (ref 135–147)
TESTOST SERPL-MCNC: 478 NG/DL (ref 95–948)
TRIGL SERPL-MCNC: 97 MG/DL
TSH SERPL DL<=0.05 MIU/L-ACNC: 1.41 UIU/ML (ref 0.45–4.5)
WBC # BLD AUTO: 7.07 THOUSAND/UL (ref 4.31–10.16)

## 2023-03-31 LAB
CANCER AG19-9 SERPL-ACNC: 6 U/ML (ref 0–35)
SARS-COV-2 SEMI-QUANT IGG AB: >800 AU/ML
SARS-COV-2 SPIKE AB INTERP CONTAINING ATTACHED ABBREV COVDSN: POSITIVE

## 2023-09-27 ENCOUNTER — APPOINTMENT (OUTPATIENT)
Dept: LAB | Facility: CLINIC | Age: 68
End: 2023-09-27
Payer: MEDICARE

## 2023-09-27 DIAGNOSIS — N40.0 BENIGN PROSTATIC HYPERPLASIA WITHOUT LOWER URINARY TRACT SYMPTOMS: ICD-10-CM

## 2023-09-27 LAB
BUN SERPL-MCNC: 22 MG/DL (ref 5–25)
CREAT SERPL-MCNC: 0.94 MG/DL (ref 0.6–1.3)
GFR SERPL CREATININE-BSD FRML MDRD: 82 ML/MIN/1.73SQ M
PSA SERPL-MCNC: 0.43 NG/ML (ref 0–4)

## 2023-09-27 PROCEDURE — 84153 ASSAY OF PSA TOTAL: CPT

## 2023-09-27 PROCEDURE — 84520 ASSAY OF UREA NITROGEN: CPT

## 2023-09-27 PROCEDURE — 36415 COLL VENOUS BLD VENIPUNCTURE: CPT

## 2023-09-27 PROCEDURE — 82565 ASSAY OF CREATININE: CPT

## 2024-04-03 ENCOUNTER — APPOINTMENT (OUTPATIENT)
Dept: LAB | Facility: CLINIC | Age: 69
End: 2024-04-03
Payer: MEDICARE

## 2024-04-03 DIAGNOSIS — Z79.899 ENCOUNTER FOR LONG-TERM (CURRENT) USE OF OTHER MEDICATIONS: ICD-10-CM

## 2024-04-03 DIAGNOSIS — N40.0 BENIGN PROSTATIC HYPERPLASIA, UNSPECIFIED WHETHER LOWER URINARY TRACT SYMPTOMS PRESENT: ICD-10-CM

## 2024-04-03 DIAGNOSIS — Z00.00 ROUTINE GENERAL MEDICAL EXAMINATION AT A HEALTH CARE FACILITY: ICD-10-CM

## 2024-04-03 DIAGNOSIS — I10 ESSENTIAL HYPERTENSION, MALIGNANT: ICD-10-CM

## 2024-04-03 DIAGNOSIS — E78.2 MIXED HYPERLIPIDEMIA: ICD-10-CM

## 2024-04-03 LAB
ALBUMIN SERPL BCP-MCNC: 4.1 G/DL (ref 3.5–5)
ALP SERPL-CCNC: 52 U/L (ref 34–104)
ALT SERPL W P-5'-P-CCNC: 16 U/L (ref 7–52)
ANION GAP SERPL CALCULATED.3IONS-SCNC: 5 MMOL/L (ref 4–13)
AST SERPL W P-5'-P-CCNC: 18 U/L (ref 13–39)
BASOPHILS # BLD AUTO: 0.03 THOUSANDS/ÂΜL (ref 0–0.1)
BASOPHILS NFR BLD AUTO: 0 % (ref 0–1)
BILIRUB SERPL-MCNC: 0.56 MG/DL (ref 0.2–1)
BILIRUB UR QL STRIP: NEGATIVE
BUN SERPL-MCNC: 19 MG/DL (ref 5–25)
CALCIUM SERPL-MCNC: 9.2 MG/DL (ref 8.4–10.2)
CHLORIDE SERPL-SCNC: 103 MMOL/L (ref 96–108)
CHOLEST SERPL-MCNC: 206 MG/DL
CLARITY UR: CLEAR
CO2 SERPL-SCNC: 30 MMOL/L (ref 21–32)
COLOR UR: NORMAL
CREAT SERPL-MCNC: 0.91 MG/DL (ref 0.6–1.3)
EOSINOPHIL # BLD AUTO: 0.32 THOUSAND/ÂΜL (ref 0–0.61)
EOSINOPHIL NFR BLD AUTO: 4 % (ref 0–6)
ERYTHROCYTE [DISTWIDTH] IN BLOOD BY AUTOMATED COUNT: 12.8 % (ref 11.6–15.1)
ERYTHROCYTE [SEDIMENTATION RATE] IN BLOOD: 26 MM/HOUR (ref 0–19)
GFR SERPL CREATININE-BSD FRML MDRD: 85 ML/MIN/1.73SQ M
GLUCOSE P FAST SERPL-MCNC: 80 MG/DL (ref 65–99)
GLUCOSE UR STRIP-MCNC: NEGATIVE MG/DL
HCT VFR BLD AUTO: 44.2 % (ref 36.5–49.3)
HDLC SERPL-MCNC: 51 MG/DL
HGB BLD-MCNC: 14.1 G/DL (ref 12–17)
HGB UR QL STRIP.AUTO: NEGATIVE
IMM GRANULOCYTES # BLD AUTO: 0.02 THOUSAND/UL (ref 0–0.2)
IMM GRANULOCYTES NFR BLD AUTO: 0 % (ref 0–2)
KETONES UR STRIP-MCNC: NEGATIVE MG/DL
LDH SERPL-CCNC: 201 U/L (ref 140–271)
LDLC SERPL CALC-MCNC: 138 MG/DL (ref 0–100)
LEUKOCYTE ESTERASE UR QL STRIP: NEGATIVE
LYMPHOCYTES # BLD AUTO: 1.53 THOUSANDS/ÂΜL (ref 0.6–4.47)
LYMPHOCYTES NFR BLD AUTO: 21 % (ref 14–44)
MCH RBC QN AUTO: 26.8 PG (ref 26.8–34.3)
MCHC RBC AUTO-ENTMCNC: 31.9 G/DL (ref 31.4–37.4)
MCV RBC AUTO: 84 FL (ref 82–98)
MONOCYTES # BLD AUTO: 0.57 THOUSAND/ÂΜL (ref 0.17–1.22)
MONOCYTES NFR BLD AUTO: 8 % (ref 4–12)
NEUTROPHILS # BLD AUTO: 4.86 THOUSANDS/ÂΜL (ref 1.85–7.62)
NEUTS SEG NFR BLD AUTO: 67 % (ref 43–75)
NITRITE UR QL STRIP: NEGATIVE
NONHDLC SERPL-MCNC: 155 MG/DL
NRBC BLD AUTO-RTO: 0 /100 WBCS
PH UR STRIP.AUTO: 6.5 [PH]
PLATELET # BLD AUTO: 314 THOUSANDS/UL (ref 149–390)
PMV BLD AUTO: 10.3 FL (ref 8.9–12.7)
POTASSIUM SERPL-SCNC: 4.3 MMOL/L (ref 3.5–5.3)
PROT SERPL-MCNC: 7.4 G/DL (ref 6.4–8.4)
PROT UR STRIP-MCNC: NEGATIVE MG/DL
PSA SERPL-MCNC: 0.37 NG/ML (ref 0–4)
RBC # BLD AUTO: 5.26 MILLION/UL (ref 3.88–5.62)
SODIUM SERPL-SCNC: 138 MMOL/L (ref 135–147)
SP GR UR STRIP.AUTO: 1.02 (ref 1–1.03)
TRIGL SERPL-MCNC: 84 MG/DL
TSH SERPL DL<=0.05 MIU/L-ACNC: 0.96 UIU/ML (ref 0.45–4.5)
UROBILINOGEN UR STRIP-ACNC: <2 MG/DL
WBC # BLD AUTO: 7.33 THOUSAND/UL (ref 4.31–10.16)

## 2024-04-03 PROCEDURE — 36415 COLL VENOUS BLD VENIPUNCTURE: CPT

## 2024-04-03 PROCEDURE — G0103 PSA SCREENING: HCPCS

## 2024-04-03 PROCEDURE — 80053 COMPREHEN METABOLIC PANEL: CPT

## 2024-04-03 PROCEDURE — 81003 URINALYSIS AUTO W/O SCOPE: CPT

## 2024-04-03 PROCEDURE — 84443 ASSAY THYROID STIM HORMONE: CPT

## 2024-04-03 PROCEDURE — 85025 COMPLETE CBC W/AUTO DIFF WBC: CPT

## 2024-04-03 PROCEDURE — 80061 LIPID PANEL: CPT

## 2024-04-03 PROCEDURE — 86301 IMMUNOASSAY TUMOR CA 19-9: CPT

## 2024-04-03 PROCEDURE — 85652 RBC SED RATE AUTOMATED: CPT

## 2024-04-03 PROCEDURE — 83615 LACTATE (LD) (LDH) ENZYME: CPT

## 2024-04-04 LAB — CANCER AG19-9 SERPL-ACNC: 7 U/ML (ref 0–35)

## 2024-05-21 ENCOUNTER — APPOINTMENT (OUTPATIENT)
Dept: LAB | Facility: CLINIC | Age: 69
End: 2024-05-21
Payer: MEDICARE

## 2024-05-21 DIAGNOSIS — K86.9 DISEASE OF PANCREAS: ICD-10-CM

## 2024-05-21 LAB — CEA SERPL-MCNC: 1.4 NG/ML (ref 0–3)

## 2024-05-21 PROCEDURE — 82378 CARCINOEMBRYONIC ANTIGEN: CPT

## 2024-05-21 PROCEDURE — 36415 COLL VENOUS BLD VENIPUNCTURE: CPT

## 2024-06-26 ENCOUNTER — APPOINTMENT (OUTPATIENT)
Dept: LAB | Facility: CLINIC | Age: 69
End: 2024-06-26
Payer: MEDICARE

## 2024-06-26 DIAGNOSIS — K86.3 CYST AND PSEUDOCYST OF PANCREAS: ICD-10-CM

## 2024-06-26 DIAGNOSIS — K86.2 CYST AND PSEUDOCYST OF PANCREAS: ICD-10-CM

## 2024-06-26 LAB
BASOPHILS # BLD AUTO: 0.03 THOUSANDS/ÂΜL (ref 0–0.1)
BASOPHILS NFR BLD AUTO: 0 % (ref 0–1)
CEA SERPL-MCNC: 1.1 NG/ML (ref 0–3)
CREAT UR-MCNC: 174.8 MG/DL
EOSINOPHIL # BLD AUTO: 0.26 THOUSAND/ÂΜL (ref 0–0.61)
EOSINOPHIL NFR BLD AUTO: 3 % (ref 0–6)
ERYTHROCYTE [DISTWIDTH] IN BLOOD BY AUTOMATED COUNT: 13.8 % (ref 11.6–15.1)
HCT VFR BLD AUTO: 41.3 % (ref 36.5–49.3)
HGB BLD-MCNC: 13.9 G/DL (ref 12–17)
IMM GRANULOCYTES # BLD AUTO: 0.03 THOUSAND/UL (ref 0–0.2)
IMM GRANULOCYTES NFR BLD AUTO: 0 % (ref 0–2)
LYMPHOCYTES # BLD AUTO: 1.51 THOUSANDS/ÂΜL (ref 0.6–4.47)
LYMPHOCYTES NFR BLD AUTO: 19 % (ref 14–44)
MCH RBC QN AUTO: 27.5 PG (ref 26.8–34.3)
MCHC RBC AUTO-ENTMCNC: 33.7 G/DL (ref 31.4–37.4)
MCV RBC AUTO: 82 FL (ref 82–98)
MICROALBUMIN UR-MCNC: 8.4 MG/L
MICROALBUMIN/CREAT 24H UR: 5 MG/G CREATININE (ref 0–30)
MONOCYTES # BLD AUTO: 0.57 THOUSAND/ÂΜL (ref 0.17–1.22)
MONOCYTES NFR BLD AUTO: 7 % (ref 4–12)
NEUTROPHILS # BLD AUTO: 5.42 THOUSANDS/ÂΜL (ref 1.85–7.62)
NEUTS SEG NFR BLD AUTO: 71 % (ref 43–75)
NRBC BLD AUTO-RTO: 0 /100 WBCS
PLATELET # BLD AUTO: 309 THOUSANDS/UL (ref 149–390)
PMV BLD AUTO: 11.1 FL (ref 8.9–12.7)
RBC # BLD AUTO: 5.05 MILLION/UL (ref 3.88–5.62)
WBC # BLD AUTO: 7.82 THOUSAND/UL (ref 4.31–10.16)

## 2024-06-26 PROCEDURE — 36415 COLL VENOUS BLD VENIPUNCTURE: CPT

## 2024-06-26 PROCEDURE — 82043 UR ALBUMIN QUANTITATIVE: CPT

## 2024-06-26 PROCEDURE — 86301 IMMUNOASSAY TUMOR CA 19-9: CPT

## 2024-06-26 PROCEDURE — 85025 COMPLETE CBC W/AUTO DIFF WBC: CPT

## 2024-06-26 PROCEDURE — 82378 CARCINOEMBRYONIC ANTIGEN: CPT

## 2024-06-26 PROCEDURE — 82570 ASSAY OF URINE CREATININE: CPT

## 2024-06-27 LAB — CANCER AG19-9 SERPL-ACNC: 5 U/ML (ref 0–35)

## 2024-08-22 ENCOUNTER — APPOINTMENT (OUTPATIENT)
Dept: LAB | Facility: CLINIC | Age: 69
End: 2024-08-22
Payer: MEDICARE

## 2024-08-22 DIAGNOSIS — K86.2 CYST AND PSEUDOCYST OF PANCREAS: ICD-10-CM

## 2024-08-22 DIAGNOSIS — Z01.818 OTHER SPECIFIED PRE-OPERATIVE EXAMINATION: ICD-10-CM

## 2024-08-22 DIAGNOSIS — K86.3 CYST AND PSEUDOCYST OF PANCREAS: ICD-10-CM

## 2024-08-22 LAB
ALBUMIN SERPL BCG-MCNC: 4 G/DL (ref 3.5–5)
ALP SERPL-CCNC: 48 U/L (ref 34–104)
ALT SERPL W P-5'-P-CCNC: 17 U/L (ref 7–52)
ANION GAP SERPL CALCULATED.3IONS-SCNC: 3 MMOL/L (ref 4–13)
AST SERPL W P-5'-P-CCNC: 16 U/L (ref 13–39)
BASOPHILS # BLD AUTO: 0.03 THOUSANDS/ÂΜL (ref 0–0.1)
BASOPHILS NFR BLD AUTO: 0 % (ref 0–1)
BILIRUB SERPL-MCNC: 0.45 MG/DL (ref 0.2–1)
BUN SERPL-MCNC: 17 MG/DL (ref 5–25)
CALCIUM SERPL-MCNC: 8.9 MG/DL (ref 8.4–10.2)
CEA SERPL-MCNC: 1.1 NG/ML (ref 0–3)
CHLORIDE SERPL-SCNC: 104 MMOL/L (ref 96–108)
CO2 SERPL-SCNC: 31 MMOL/L (ref 21–32)
CREAT SERPL-MCNC: 0.8 MG/DL (ref 0.6–1.3)
EOSINOPHIL # BLD AUTO: 0.2 THOUSAND/ÂΜL (ref 0–0.61)
EOSINOPHIL NFR BLD AUTO: 3 % (ref 0–6)
ERYTHROCYTE [DISTWIDTH] IN BLOOD BY AUTOMATED COUNT: 13 % (ref 11.6–15.1)
GFR SERPL CREATININE-BSD FRML MDRD: 91 ML/MIN/1.73SQ M
GLUCOSE SERPL-MCNC: 89 MG/DL (ref 65–140)
HCT VFR BLD AUTO: 41.4 % (ref 36.5–49.3)
HGB BLD-MCNC: 13.6 G/DL (ref 12–17)
IMM GRANULOCYTES # BLD AUTO: 0.03 THOUSAND/UL (ref 0–0.2)
IMM GRANULOCYTES NFR BLD AUTO: 0 % (ref 0–2)
LYMPHOCYTES # BLD AUTO: 1.33 THOUSANDS/ÂΜL (ref 0.6–4.47)
LYMPHOCYTES NFR BLD AUTO: 17 % (ref 14–44)
MCH RBC QN AUTO: 27.5 PG (ref 26.8–34.3)
MCHC RBC AUTO-ENTMCNC: 32.9 G/DL (ref 31.4–37.4)
MCV RBC AUTO: 84 FL (ref 82–98)
MONOCYTES # BLD AUTO: 0.54 THOUSAND/ÂΜL (ref 0.17–1.22)
MONOCYTES NFR BLD AUTO: 7 % (ref 4–12)
NEUTROPHILS # BLD AUTO: 5.89 THOUSANDS/ÂΜL (ref 1.85–7.62)
NEUTS SEG NFR BLD AUTO: 73 % (ref 43–75)
NRBC BLD AUTO-RTO: 0 /100 WBCS
PLATELET # BLD AUTO: 375 THOUSANDS/UL (ref 149–390)
PMV BLD AUTO: 9.9 FL (ref 8.9–12.7)
POTASSIUM SERPL-SCNC: 4.2 MMOL/L (ref 3.5–5.3)
PROT SERPL-MCNC: 7.3 G/DL (ref 6.4–8.4)
RBC # BLD AUTO: 4.95 MILLION/UL (ref 3.88–5.62)
SODIUM SERPL-SCNC: 138 MMOL/L (ref 135–147)
WBC # BLD AUTO: 8.02 THOUSAND/UL (ref 4.31–10.16)

## 2024-08-22 PROCEDURE — 36415 COLL VENOUS BLD VENIPUNCTURE: CPT

## 2024-08-22 PROCEDURE — 85025 COMPLETE CBC W/AUTO DIFF WBC: CPT

## 2024-08-22 PROCEDURE — 80053 COMPREHEN METABOLIC PANEL: CPT

## 2024-08-22 PROCEDURE — 82378 CARCINOEMBRYONIC ANTIGEN: CPT

## 2024-08-22 PROCEDURE — 86301 IMMUNOASSAY TUMOR CA 19-9: CPT

## 2024-08-23 LAB — CANCER AG19-9 SERPL-ACNC: 5 U/ML (ref 0–35)

## 2024-10-02 ENCOUNTER — OFFICE VISIT (OUTPATIENT)
Dept: GASTROENTEROLOGY | Facility: CLINIC | Age: 69
End: 2024-10-02
Payer: MEDICARE

## 2024-10-02 VITALS
HEIGHT: 68 IN | HEART RATE: 84 BPM | BODY MASS INDEX: 31.43 KG/M2 | DIASTOLIC BLOOD PRESSURE: 79 MMHG | SYSTOLIC BLOOD PRESSURE: 128 MMHG | WEIGHT: 207.4 LBS

## 2024-10-02 DIAGNOSIS — D49.0 IPMN (INTRADUCTAL PAPILLARY MUCINOUS NEOPLASM): Primary | ICD-10-CM

## 2024-10-02 DIAGNOSIS — K22.2 SCHATZKI RING OF DISTAL ESOPHAGUS: ICD-10-CM

## 2024-10-02 PROCEDURE — 99203 OFFICE O/P NEW LOW 30 MIN: CPT | Performed by: INTERNAL MEDICINE

## 2024-10-02 NOTE — PROGRESS NOTES
Ambulatory Visit  Name: Dieudonne Andres Jr.      : 1955      MRN: [id number]  Encounter Provider: Eladio Newman MD  Encounter Date: 10/2/2024   Encounter department: [facility] GASTROENTEROLOGY [address]    Assessment & Plan  IPMN (intraductal papillary mucinous neoplasm)  These appear to have increased in size over the course of 2 years, now with 1 cyst in the pancreatic neck measuring 40 mm but with no other worrisome features.  No symptoms attributable to his cysts.  Agree with recommendation to repeat MRI in 6 months for continued surveillance.  Discussed the risk of malignant transformation and option of surgery with patient.       Schatzki ring of distal esophagus  This is benign and asymptomatic.         History of Present Illness     Dieudonne Andres Jr. is a 69 y.o. male who presents for second opinion regarding a number of findings on MRI.  He underwent MRI following a bout of COVID-pneumonia in  and was noted to have multiple pancreatic cysts.  Subsequent EUS suggested IPMN's.  EGD at that time was unremarkable.  Has undergone repeat MRI with some increased size of his cysts and repeat EUS 2024 again revealed multiple cysts with no worrisome features with the exception of a single cyst greater than 3 cm (4 cm).  Fluid analysis revealed an elevated CEA and amylase confirming IPMN.  Histopathology did not suggest malignancy though cells were scant.  Recommendation was to repeat MRI in 6 months.  EGD at that time revealed Schatzki's ring but was otherwise unremarkable.  Patient has no symptoms, namely no dysphagia or odynophagia, regurgitation, heartburn, nausea or vomiting, unexplained weight loss, abdominal pain, change in bowel habit, jaundice or rash.  LFTs are normal.  No history of pancreatitis, diabetes      Review of Systems   Gastrointestinal:  Negative for abdominal distention, abdominal pain, blood in stool, constipation, diarrhea, nausea and vomiting.  "          Objective     /79 (BP Location: Right arm, Patient Position: Sitting, Cuff Size: Standard)   Pulse 84   Ht 5' 8\" (1.727 m)   Wt 94.1 kg (207 lb 6.4 oz)   BMI 31.54 kg/m²     Physical Exam    "

## 2024-10-07 ENCOUNTER — APPOINTMENT (OUTPATIENT)
Dept: LAB | Facility: CLINIC | Age: 69
End: 2024-10-07
Payer: MEDICARE

## 2024-10-07 DIAGNOSIS — I10 ESSENTIAL HYPERTENSION, MALIGNANT: ICD-10-CM

## 2024-10-07 DIAGNOSIS — E78.2 MIXED HYPERLIPIDEMIA: ICD-10-CM

## 2024-10-07 LAB
ALBUMIN SERPL BCG-MCNC: 3.9 G/DL (ref 3.5–5)
ALP SERPL-CCNC: 50 U/L (ref 34–104)
ALT SERPL W P-5'-P-CCNC: 16 U/L (ref 7–52)
ANION GAP SERPL CALCULATED.3IONS-SCNC: 6 MMOL/L (ref 4–13)
AST SERPL W P-5'-P-CCNC: 16 U/L (ref 13–39)
BACTERIA UR QL AUTO: ABNORMAL /HPF
BASOPHILS # BLD AUTO: 0.03 THOUSANDS/ΜL (ref 0–0.1)
BASOPHILS NFR BLD AUTO: 0 % (ref 0–1)
BILIRUB SERPL-MCNC: 0.46 MG/DL (ref 0.2–1)
BILIRUB UR QL STRIP: NEGATIVE
BUN SERPL-MCNC: 18 MG/DL (ref 5–25)
CALCIUM SERPL-MCNC: 9.1 MG/DL (ref 8.4–10.2)
CHLORIDE SERPL-SCNC: 102 MMOL/L (ref 96–108)
CHOLEST SERPL-MCNC: 180 MG/DL
CLARITY UR: CLEAR
CO2 SERPL-SCNC: 30 MMOL/L (ref 21–32)
COLOR UR: ABNORMAL
CREAT SERPL-MCNC: 0.83 MG/DL (ref 0.6–1.3)
EOSINOPHIL # BLD AUTO: 0.31 THOUSAND/ΜL (ref 0–0.61)
EOSINOPHIL NFR BLD AUTO: 3 % (ref 0–6)
ERYTHROCYTE [DISTWIDTH] IN BLOOD BY AUTOMATED COUNT: 13.3 % (ref 11.6–15.1)
ERYTHROCYTE [SEDIMENTATION RATE] IN BLOOD: 23 MM/HOUR (ref 0–19)
GFR SERPL CREATININE-BSD FRML MDRD: 89 ML/MIN/1.73SQ M
GLUCOSE P FAST SERPL-MCNC: 93 MG/DL (ref 65–99)
GLUCOSE UR STRIP-MCNC: NEGATIVE MG/DL
HCT VFR BLD AUTO: 39.5 % (ref 36.5–49.3)
HDLC SERPL-MCNC: 49 MG/DL
HGB BLD-MCNC: 12.8 G/DL (ref 12–17)
HGB UR QL STRIP.AUTO: NEGATIVE
IMM GRANULOCYTES # BLD AUTO: 0.04 THOUSAND/UL (ref 0–0.2)
IMM GRANULOCYTES NFR BLD AUTO: 0 % (ref 0–2)
KETONES UR STRIP-MCNC: NEGATIVE MG/DL
LDLC SERPL CALC-MCNC: 116 MG/DL (ref 0–100)
LEUKOCYTE ESTERASE UR QL STRIP: NEGATIVE
LYMPHOCYTES # BLD AUTO: 1.43 THOUSANDS/ΜL (ref 0.6–4.47)
LYMPHOCYTES NFR BLD AUTO: 16 % (ref 14–44)
MCH RBC QN AUTO: 26.8 PG (ref 26.8–34.3)
MCHC RBC AUTO-ENTMCNC: 32.4 G/DL (ref 31.4–37.4)
MCV RBC AUTO: 83 FL (ref 82–98)
MONOCYTES # BLD AUTO: 0.56 THOUSAND/ΜL (ref 0.17–1.22)
MONOCYTES NFR BLD AUTO: 6 % (ref 4–12)
MUCOUS THREADS UR QL AUTO: ABNORMAL
NEUTROPHILS # BLD AUTO: 6.72 THOUSANDS/ΜL (ref 1.85–7.62)
NEUTS SEG NFR BLD AUTO: 75 % (ref 43–75)
NITRITE UR QL STRIP: NEGATIVE
NON-SQ EPI CELLS URNS QL MICRO: ABNORMAL /HPF
NONHDLC SERPL-MCNC: 131 MG/DL
NRBC BLD AUTO-RTO: 0 /100 WBCS
PH UR STRIP.AUTO: 6 [PH]
PLATELET # BLD AUTO: 319 THOUSANDS/UL (ref 149–390)
PMV BLD AUTO: 10.1 FL (ref 8.9–12.7)
POTASSIUM SERPL-SCNC: 3.8 MMOL/L (ref 3.5–5.3)
PROT SERPL-MCNC: 7.2 G/DL (ref 6.4–8.4)
PROT UR STRIP-MCNC: NEGATIVE MG/DL
PSA SERPL-MCNC: 0.18 NG/ML (ref 0–4)
RBC # BLD AUTO: 4.77 MILLION/UL (ref 3.88–5.62)
RBC #/AREA URNS AUTO: ABNORMAL /HPF
SODIUM SERPL-SCNC: 138 MMOL/L (ref 135–147)
SP GR UR STRIP.AUTO: 1.02 (ref 1–1.03)
TRIGL SERPL-MCNC: 76 MG/DL
TSH SERPL DL<=0.05 MIU/L-ACNC: 1.44 UIU/ML (ref 0.45–4.5)
TSH SERPL DL<=0.05 MIU/L-ACNC: 1.48 UIU/ML (ref 0.45–4.5)
UROBILINOGEN UR STRIP-ACNC: <2 MG/DL
WBC # BLD AUTO: 9.09 THOUSAND/UL (ref 4.31–10.16)
WBC #/AREA URNS AUTO: ABNORMAL /HPF

## 2024-10-07 PROCEDURE — 80053 COMPREHEN METABOLIC PANEL: CPT

## 2024-10-07 PROCEDURE — 85652 RBC SED RATE AUTOMATED: CPT

## 2024-10-07 PROCEDURE — 36415 COLL VENOUS BLD VENIPUNCTURE: CPT

## 2024-10-07 PROCEDURE — 80061 LIPID PANEL: CPT

## 2024-10-07 PROCEDURE — G0103 PSA SCREENING: HCPCS

## 2024-10-07 PROCEDURE — 81001 URINALYSIS AUTO W/SCOPE: CPT

## 2024-10-07 PROCEDURE — 84443 ASSAY THYROID STIM HORMONE: CPT

## 2024-10-07 PROCEDURE — 85025 COMPLETE CBC W/AUTO DIFF WBC: CPT

## 2024-12-17 ENCOUNTER — APPOINTMENT (EMERGENCY)
Dept: CT IMAGING | Facility: HOSPITAL | Age: 69
End: 2024-12-17
Payer: MEDICARE

## 2024-12-17 ENCOUNTER — HOSPITAL ENCOUNTER (EMERGENCY)
Facility: HOSPITAL | Age: 69
Discharge: HOME | End: 2024-12-17
Attending: EMERGENCY MEDICINE
Payer: MEDICARE

## 2024-12-17 VITALS
RESPIRATION RATE: 16 BRPM | OXYGEN SATURATION: 95 % | DIASTOLIC BLOOD PRESSURE: 58 MMHG | BODY MASS INDEX: 31.47 KG/M2 | HEART RATE: 87 BPM | WEIGHT: 207 LBS | TEMPERATURE: 98.4 F | SYSTOLIC BLOOD PRESSURE: 113 MMHG

## 2024-12-17 DIAGNOSIS — R10.84 GENERALIZED ABDOMINAL PAIN: ICD-10-CM

## 2024-12-17 DIAGNOSIS — R11.2 NAUSEA AND VOMITING: Primary | ICD-10-CM

## 2024-12-17 LAB
2HR DELTA HS TROPONIN: 2 NG/L
ALBUMIN SERPL BCG-MCNC: 4.2 G/DL (ref 3.5–5)
ALP SERPL-CCNC: 53 U/L (ref 34–104)
ALT SERPL W P-5'-P-CCNC: 28 U/L (ref 7–52)
ANION GAP SERPL CALCULATED.3IONS-SCNC: 6 MMOL/L (ref 4–13)
AST SERPL W P-5'-P-CCNC: 25 U/L (ref 13–39)
BASOPHILS # BLD AUTO: 0.02 THOUSANDS/ΜL (ref 0–0.1)
BASOPHILS NFR BLD AUTO: 0 % (ref 0–1)
BILIRUB SERPL-MCNC: 0.56 MG/DL (ref 0.2–1)
BILIRUB UR QL STRIP: NEGATIVE
BUN SERPL-MCNC: 22 MG/DL (ref 5–25)
CALCIUM SERPL-MCNC: 9.1 MG/DL (ref 8.4–10.2)
CARDIAC TROPONIN I PNL SERPL HS: 5 NG/L (ref ?–50)
CARDIAC TROPONIN I PNL SERPL HS: 7 NG/L (ref ?–50)
CHLORIDE SERPL-SCNC: 101 MMOL/L (ref 96–108)
CLARITY UR: CLEAR
CO2 SERPL-SCNC: 30 MMOL/L (ref 21–32)
COLOR UR: YELLOW
CREAT SERPL-MCNC: 0.92 MG/DL (ref 0.6–1.3)
EOSINOPHIL # BLD AUTO: 0.13 THOUSAND/ΜL (ref 0–0.61)
EOSINOPHIL NFR BLD AUTO: 1 % (ref 0–6)
ERYTHROCYTE [DISTWIDTH] IN BLOOD BY AUTOMATED COUNT: 13.9 % (ref 11.6–15.1)
GFR SERPL CREATININE-BSD FRML MDRD: 84 ML/MIN/1.73SQ M
GLUCOSE SERPL-MCNC: 126 MG/DL (ref 65–140)
GLUCOSE UR STRIP-MCNC: NEGATIVE MG/DL
HCT VFR BLD AUTO: 45.9 % (ref 36.5–49.3)
HGB BLD-MCNC: 14.9 G/DL (ref 12–17)
HGB UR QL STRIP.AUTO: NEGATIVE
IMM GRANULOCYTES # BLD AUTO: 0.04 THOUSAND/UL (ref 0–0.2)
IMM GRANULOCYTES NFR BLD AUTO: 0 % (ref 0–2)
KETONES UR STRIP-MCNC: NEGATIVE MG/DL
LEUKOCYTE ESTERASE UR QL STRIP: NEGATIVE
LIPASE SERPL-CCNC: 34 U/L (ref 11–82)
LYMPHOCYTES # BLD AUTO: 0.27 THOUSANDS/ΜL (ref 0.6–4.47)
LYMPHOCYTES NFR BLD AUTO: 3 % (ref 14–44)
MCH RBC QN AUTO: 26.8 PG (ref 26.8–34.3)
MCHC RBC AUTO-ENTMCNC: 32.5 G/DL (ref 31.4–37.4)
MCV RBC AUTO: 82 FL (ref 82–98)
MONOCYTES # BLD AUTO: 0.34 THOUSAND/ΜL (ref 0.17–1.22)
MONOCYTES NFR BLD AUTO: 3 % (ref 4–12)
NEUTROPHILS # BLD AUTO: 10.06 THOUSANDS/ΜL (ref 1.85–7.62)
NEUTS SEG NFR BLD AUTO: 93 % (ref 43–75)
NITRITE UR QL STRIP: NEGATIVE
NRBC BLD AUTO-RTO: 0 /100 WBCS
PH UR STRIP.AUTO: 7.5 [PH]
PLATELET # BLD AUTO: 311 THOUSANDS/UL (ref 149–390)
PMV BLD AUTO: 10 FL (ref 8.9–12.7)
POTASSIUM SERPL-SCNC: 4 MMOL/L (ref 3.5–5.3)
PROT SERPL-MCNC: 7.9 G/DL (ref 6.4–8.4)
PROT UR STRIP-MCNC: NEGATIVE MG/DL
RBC # BLD AUTO: 5.57 MILLION/UL (ref 3.88–5.62)
SODIUM SERPL-SCNC: 137 MMOL/L (ref 135–147)
SP GR UR STRIP.AUTO: 1.01 (ref 1–1.03)
UROBILINOGEN UR QL STRIP.AUTO: 0.2 E.U./DL
WBC # BLD AUTO: 10.86 THOUSAND/UL (ref 4.31–10.16)

## 2024-12-17 PROCEDURE — 80053 COMPREHEN METABOLIC PANEL: CPT

## 2024-12-17 PROCEDURE — 74177 CT ABD & PELVIS W/CONTRAST: CPT

## 2024-12-17 PROCEDURE — 96361 HYDRATE IV INFUSION ADD-ON: CPT

## 2024-12-17 PROCEDURE — 99284 EMERGENCY DEPT VISIT MOD MDM: CPT

## 2024-12-17 PROCEDURE — 83690 ASSAY OF LIPASE: CPT

## 2024-12-17 PROCEDURE — 81003 URINALYSIS AUTO W/O SCOPE: CPT | Performed by: EMERGENCY MEDICINE

## 2024-12-17 PROCEDURE — 93005 ELECTROCARDIOGRAM TRACING: CPT

## 2024-12-17 PROCEDURE — 85025 COMPLETE CBC W/AUTO DIFF WBC: CPT

## 2024-12-17 PROCEDURE — 96365 THER/PROPH/DIAG IV INF INIT: CPT

## 2024-12-17 PROCEDURE — 36415 COLL VENOUS BLD VENIPUNCTURE: CPT

## 2024-12-17 PROCEDURE — 84484 ASSAY OF TROPONIN QUANT: CPT | Performed by: EMERGENCY MEDICINE

## 2024-12-17 PROCEDURE — 96375 TX/PRO/DX INJ NEW DRUG ADDON: CPT

## 2024-12-17 PROCEDURE — 99285 EMERGENCY DEPT VISIT HI MDM: CPT | Performed by: EMERGENCY MEDICINE

## 2024-12-17 RX ORDER — DICYCLOMINE HCL 20 MG
20 TABLET ORAL EVERY 6 HOURS PRN
Qty: 20 TABLET | Refills: 0 | Status: SHIPPED | OUTPATIENT
Start: 2024-12-17 | End: 2024-12-22

## 2024-12-17 RX ORDER — KETOROLAC TROMETHAMINE 30 MG/ML
15 INJECTION, SOLUTION INTRAMUSCULAR; INTRAVENOUS ONCE
Status: COMPLETED | OUTPATIENT
Start: 2024-12-17 | End: 2024-12-17

## 2024-12-17 RX ORDER — ONDANSETRON 4 MG/1
4 TABLET, FILM COATED ORAL EVERY 8 HOURS PRN
Qty: 15 TABLET | Refills: 0 | Status: SHIPPED | OUTPATIENT
Start: 2024-12-17 | End: 2024-12-22

## 2024-12-17 RX ORDER — ONDANSETRON 2 MG/ML
4 INJECTION INTRAMUSCULAR; INTRAVENOUS ONCE
Status: COMPLETED | OUTPATIENT
Start: 2024-12-17 | End: 2024-12-17

## 2024-12-17 RX ORDER — ACETAMINOPHEN 10 MG/ML
1000 INJECTION, SOLUTION INTRAVENOUS ONCE
Status: COMPLETED | OUTPATIENT
Start: 2024-12-17 | End: 2024-12-17

## 2024-12-17 RX ADMIN — IOHEXOL 100 ML: 350 INJECTION, SOLUTION INTRAVENOUS at 10:03

## 2024-12-17 RX ADMIN — SODIUM CHLORIDE 1000 ML: 0.9 INJECTION, SOLUTION INTRAVENOUS at 09:23

## 2024-12-17 RX ADMIN — KETOROLAC TROMETHAMINE 15 MG: 30 INJECTION, SOLUTION INTRAMUSCULAR at 10:50

## 2024-12-17 RX ADMIN — ACETAMINOPHEN 1000 MG: 10 INJECTION INTRAVENOUS at 10:53

## 2024-12-17 RX ADMIN — ONDANSETRON 4 MG: 2 INJECTION INTRAMUSCULAR; INTRAVENOUS at 09:23

## 2024-12-17 NOTE — ED PROVIDER NOTES
Time reflects when diagnosis was documented in both MDM as applicable and the Disposition within this note       Time User Action Codes Description Comment    12/17/2024 12:37 PM Mariia Murcia Add [R11.2] Nausea and vomiting     12/17/2024 12:37 PM Mariia Murcia Add [R10.84] Generalized abdominal pain           ED Disposition       ED Disposition   Discharge    Condition   Stable    Date/Time   Tue Dec 17, 2024 12:37 PM    Comment   Dieudonne Andres Jr. discharge to home/self care.                   Assessment & Plan       Medical Decision Making  69-year-old male presenting for evaluation of nausea and vomiting.  Some mild generalized abdominal discomfort.  Vital signs stable.  Exam overall benign.  Differential diagnoses include but not limited to viral gastroenteritis, foodborne illness, obstruction, colitis, gastritis, dehydration, electrolyte abnormality, atypical ACS.  Labs overall unremarkable.  UA negative for UTI.  CT abdomen pelvis showing pancreatic lesions which are chronic in nature and patient is following up with his primary care physician regarding this.  Patient was treated symptomatically with improvement in symptoms.  He was ultimately able to tolerate oral intake in the department and is stable for discharge.  Advised follow-up with PCP.  Return precautions discussed.    Problems Addressed:  Generalized abdominal pain: acute illness or injury  Nausea and vomiting: acute illness or injury    Amount and/or Complexity of Data Reviewed  Labs: ordered. Decision-making details documented in ED Course.  Radiology: ordered.  ECG/medicine tests: ordered and independent interpretation performed. Decision-making details documented in ED Course.    Risk  Prescription drug management.        ED Course as of 12/17/24 1246   Tue Dec 17, 2024   0939 Procedure Note: EKG  Date/Time: 12/17/24 9:37 AM   Interpreted by: Mariia Murcia  Indications / Diagnosis: vomiting  ECG reviewed by me, the ED Provider: yes    The EKG demonstrates:  Rhythm: rate 86, normal sinus  Intervals: normal intervals  Axis: normal axis  QRS/Blocks: LAFB  ST Changes: No acute ST Changes, no STD/BANDAR.    0947 LIPASE: 34   0947 Comprehensive metabolic panel   1003 CBC and differential(!)   1123 UA w Reflex to Microscopic w Reflex to Culture   1237 Patient re-evaluated. Tolerating PO. Feeling much improved.       Medications   sodium chloride 0.9 % bolus 1,000 mL (0 mL Intravenous Stopped 12/17/24 1057)   ondansetron (ZOFRAN) injection 4 mg (4 mg Intravenous Given 12/17/24 0923)   iohexol (OMNIPAQUE) 350 MG/ML injection (SINGLE-DOSE) 100 mL (100 mL Intravenous Given 12/17/24 1003)   ketorolac (TORADOL) injection 15 mg (15 mg Intravenous Given 12/17/24 1050)   acetaminophen (Ofirmev) injection 1,000 mg (0 mg Intravenous Stopped 12/17/24 1151)       ED Risk Strat Scores                          SBIRT 20yo+      Flowsheet Row Most Recent Value   Initial Alcohol Screen: US AUDIT-C     1. How often do you have a drink containing alcohol? 0 Filed at: 12/17/2024 0911   2. How many drinks containing alcohol do you have on a typical day you are drinking?  0 Filed at: 12/17/2024 0911   3a. Male UNDER 65: How often do you have five or more drinks on one occasion? 0 Filed at: 12/17/2024 0911   Audit-C Score 0 Filed at: 12/17/2024 0911   NOEL: How many times in the past year have you...    Used an illegal drug or used a prescription medication for non-medical reasons? Never Filed at: 12/17/2024 0911                            History of Present Illness       Chief Complaint   Patient presents with    Vomiting     Vomiting since 4am.  Son had Norovirus 1 week ago.       Past Medical History:   Diagnosis Date    Asthma     DVT (deep venous thrombosis) (HCC)     left arm    Hypertension       Past Surgical History:   Procedure Laterality Date    REPAIR TENDONS LEG Left     ROTATOR CUFF REPAIR Left       History reviewed. No pertinent family history.   Social History      Tobacco Use    Smoking status: Never    Smokeless tobacco: Never   Substance Use Topics    Alcohol use: Yes     Comment: seldom    Drug use: No      E-Cigarette/Vaping      E-Cigarette/Vaping Substances      I have reviewed and agree with the history as documented.     69-year-old male Struve hypertension, asthma, DVT on Eliquis who presents for evaluation of vomiting.  Patient reports onset of symptoms around 4 AM.  He has had nausea with multiple episodes of nonbloody, nonbilious vomiting.  He has some generalized abdominal discomfort as well.  He notes that his family members were sick with norovirus approximately a week ago with similar symptoms.  He also thinks this may be from prepared food that he ate from [employer] last night.  No diarrhea.  No fevers that he knows of, urinary symptoms, chest pain, shortness of breath.  No medications prior to arrival for his symptoms.        Review of Systems   Constitutional:  Negative for chills and fever.   Respiratory:  Negative for shortness of breath.    Cardiovascular:  Negative for chest pain.   Gastrointestinal:  Positive for abdominal pain, nausea and vomiting. Negative for constipation and diarrhea.   Genitourinary:  Negative for dysuria, flank pain and frequency.   Musculoskeletal:  Negative for gait problem.   Skin:  Negative for rash.   Neurological:  Negative for weakness and light-headedness.   All other systems reviewed and are negative.          Objective       ED Triage Vitals [12/17/24 0912]   Temperature Pulse Blood Pressure Respirations SpO2 Patient Position - Orthostatic VS   98.4 °F (36.9 °C) 95 143/75 18 96 % Lying      Temp Source Heart Rate Source BP Location FiO2 (%) Pain Score    Oral Monitor Right arm -- 7      Vitals      Date and Time Temp Pulse SpO2 Resp BP Pain Score FACES Pain Rating User   12/17/24 1050 -- -- -- -- -- 7 -- RR   12/17/24 0912 98.4 °F (36.9 °C) 95 96 % 18 143/75 7 head and stomach -- RR            Physical Exam  Vitals  and nursing note reviewed.   Constitutional:       General: He is not in acute distress.     Appearance: He is not ill-appearing.   HENT:      Head: Normocephalic and atraumatic.      Nose: Nose normal.      Mouth/Throat:      Mouth: Mucous membranes are dry.      Pharynx: No oropharyngeal exudate or posterior oropharyngeal erythema.   Eyes:      Conjunctiva/sclera: Conjunctivae normal.   Cardiovascular:      Rate and Rhythm: Normal rate and regular rhythm.      Heart sounds: No murmur heard.     No friction rub. No gallop.   Pulmonary:      Effort: Pulmonary effort is normal.      Breath sounds: Normal breath sounds. No wheezing, rhonchi or rales.   Abdominal:      General: There is no distension.      Palpations: Abdomen is soft.      Tenderness: There is no abdominal tenderness.   Musculoskeletal:         General: No swelling or tenderness. Normal range of motion.      Cervical back: Normal range of motion and neck supple.   Skin:     General: Skin is warm and dry.      Coloration: Skin is not pale.      Findings: No rash.   Neurological:      General: No focal deficit present.      Mental Status: He is alert and oriented to person, place, and time.   Psychiatric:         Behavior: Behavior normal.         Results Reviewed       Procedure Component Value Units Date/Time    HS Troponin I 2hr [[id number]]  (Normal) Collected: 12/17/24 1149    Lab Status: Final result Specimen: Blood from Arm, Right Updated: 12/17/24 1219     hs TnI 2hr 7 ng/L      Delta 2hr hsTnI 2 ng/L     UA w Reflex to Microscopic w Reflex to Culture [[id number]]  (Normal) Collected: 12/17/24 1103    Lab Status: Final result Specimen: Urine, Clean Catch Updated: 12/17/24 1123     Color, UA Yellow     Clarity, UA Clear     Specific Gravity, UA 1.010     pH, UA 7.5     Leukocytes, UA Negative     Nitrite, UA Negative     Protein, UA Negative mg/dl      Glucose, UA Negative mg/dl      Ketones, UA Negative mg/dl      Urobilinogen, UA 0.2 E.U./dl       Bilirubin, UA Negative     Occult Blood, UA Negative    HS Troponin I 4hr [[id number]]     Lab Status: No result Specimen: Blood     HS Troponin 0hr (reflex protocol) [[id number]]  (Normal) Collected: 12/17/24 0921    Lab Status: Final result Specimen: Blood from Arm, Right Updated: 12/17/24 1010     hs TnI 0hr 5 ng/L     CBC and differential [[id number]]  (Abnormal) Collected: 12/17/24 0921    Lab Status: Final result Specimen: Blood from Arm, Right Updated: 12/17/24 1001     WBC 10.86 Thousand/uL      RBC 5.57 Million/uL      Hemoglobin 14.9 g/dL      Hematocrit 45.9 %      MCV 82 fL      MCH 26.8 pg      MCHC 32.5 g/dL      RDW 13.9 %      MPV 10.0 fL      Platelets 311 Thousands/uL      nRBC 0 /100 WBCs      Segmented % 93 %      Immature Grans % 0 %      Lymphocytes % 3 %      Monocytes % 3 %      Eosinophils Relative 1 %      Basophils Relative 0 %      Absolute Neutrophils 10.06 Thousands/µL      Absolute Immature Grans 0.04 Thousand/uL      Absolute Lymphocytes 0.27 Thousands/µL      Absolute Monocytes 0.34 Thousand/µL      Eosinophils Absolute 0.13 Thousand/µL      Basophils Absolute 0.02 Thousands/µL     Narrative:      This is an appended report.  These results have been appended to a previously verified report.    Comprehensive metabolic panel [[id number]] Collected: 12/17/24 0921    Lab Status: Final result Specimen: Blood from Arm, Right Updated: 12/17/24 0947     Sodium 137 mmol/L      Potassium 4.0 mmol/L      Chloride 101 mmol/L      CO2 30 mmol/L      ANION GAP 6 mmol/L      BUN 22 mg/dL      Creatinine 0.92 mg/dL      Glucose 126 mg/dL      Calcium 9.1 mg/dL      AST 25 U/L      ALT 28 U/L      Alkaline Phosphatase 53 U/L      Total Protein 7.9 g/dL      Albumin 4.2 g/dL      Total Bilirubin 0.56 mg/dL      eGFR 84 ml/min/1.73sq m     Narrative:      [employer] guidelines for Chronic Kidney Disease (CKD):     Stage 1 with normal or high GFR (GFR > 90 mL/min/1.73 square  meters)    Stage 2 Mild CKD (GFR = 60-89 mL/min/1.73 square meters)    Stage 3A Moderate CKD (GFR = 45-59 mL/min/1.73 square meters)    Stage 3B Moderate CKD (GFR = 30-44 mL/min/1.73 square meters)    Stage 4 Severe CKD (GFR = 15-29 mL/min/1.73 square meters)    Stage 5 End Stage CKD (GFR <15 mL/min/1.73 square meters)  Note: GFR calculation is accurate only with a steady state creatinine    Lipase [[id number]]  (Normal) Collected: 12/17/24 0921    Lab Status: Final result Specimen: Blood from Arm, Right Updated: 12/17/24 0947     Lipase 34 u/L             CT abdomen pelvis with contrast   Final Interpretation by Jean Harrington MD (12/17 1045)      1.  No identifiable acute abnormality to account for the patient's clinical presentation.   2.  Multiple pancreatic cystic lesions, suggesting intraductal papillary mucinous neoplasms. While these have no overtly suspicious features, further evaluation by nonemergent MRI/MRCP with gadolinium is recommended.   3.  New platelike right lower lobe and nodular lingular opacities, incompletely assessed on this exam. Correlation with recent prior outside imaging or follow-up dedicated chest CT is recommended for further evaluation. This may represent atelectasis    and/or scarring.      Please refer to the report body for description of other incidental, chronic and/or benign findings.         Workstation performed: [id number]             Procedures    ED Medication and Procedure Management   Prior to Admission Medications   Prescriptions Last Dose Informant Patient Reported? Taking?   ALBUTEROL SULFATE IN  Self Yes No   Sig: Inhale.   Apixaban (ELIQUIS PO)  Self Yes No   Sig: Take 5 mg by mouth in the morning   MINOXIDIL PO  Self Yes No   Sig: Take 10 mg by mouth 2 (two) times a day    Testosterone (ANDROGEL PUMP TD)  Self Yes No   Sig: Place on the skin in the morning   budesonide-formoterol (SYMBICORT) 160-4.5 mcg/act inhaler  Self Yes No   Sig: Inhale 2 puffs 2 (two)  times a day.   Patient not taking: Reported on 10/2/2024   loratadine (CLARITIN) 10 mg tablet  Self Yes No   Sig: Take 10 mg by mouth daily.   Patient not taking: Reported on 10/2/2024   montelukast (SINGULAIR) 10 mg tablet  Self Yes No   Sig: Take 10 mg by mouth daily at bedtime   Patient not taking: Reported on 10/2/2024   ondansetron (Zofran ODT) 4 mg disintegrating tablet  Self No No   Sig: Take 1 tablet (4 mg total) by mouth every 6 (six) hours as needed for nausea or vomiting   Patient not taking: Reported on 10/2/2024   promethazine-dextromethorphan (PHENERGAN-DM) 6.25-15 mg/5 mL oral syrup  Self Yes No   Sig: TAKE ONE TEASPOON BY MOUTH EVERY 6 HOURS   Patient not taking: Reported on 10/2/2024      Facility-Administered Medications: None     Patient's Medications   Discharge Prescriptions    DICYCLOMINE (BENTYL) 20 MG TABLET    Take 1 tablet (20 mg total) by mouth every 6 (six) hours as needed (abdominal pain) for up to 5 days       Start Date: 12/17/2024End Date: 12/22/2024       Order Dose: 20 mg       Quantity: 20 tablet    Refills: 0    ONDANSETRON (ZOFRAN) 4 MG TABLET    Take 1 tablet (4 mg total) by mouth every 8 (eight) hours as needed for nausea or vomiting for up to 5 days       Start Date: 12/17/2024End Date: 12/22/2024       Order Dose: 4 mg       Quantity: 15 tablet    Refills: 0     No discharge procedures on file.  ED SEPSIS DOCUMENTATION   Time reflects when diagnosis was documented in both MDM as applicable and the Disposition within this note       Time User Action Codes Description Comment    12/17/2024 12:37 PM Mariia Murcia [R11.2] Nausea and vomiting     12/17/2024 12:37 PM Mariia Murcia [R10.84] Generalized abdominal pain                  Mariia Murcia MD  12/17/24 9710

## 2024-12-17 NOTE — DISCHARGE INSTRUCTIONS
Follow-up with your primary care physician.  Take the prescribed occasions as directed.  Stay well-hydrated.  Please return to the emergency department if you develop worsening symptoms, severe pain, uncontrolled vomiting, anything else concerning to you.

## 2024-12-17 NOTE — INCIDENTAL FINDINGS
The following findings require follow up:  Radiographic finding   Finding: Multiple pancreatic cystic lesions, suggesting intraductal papillary mucinous neoplasms. While these have no overtly suspicious features, further evaluation by nonemergent MRI/MRCP with gadolinium is recommended.  Platelike right lower lobe and nodular lingular opacities, incompletely assessed on this exam. Correlation with recent prior outside imaging or follow-up dedicated chest CT is recommended for further evaluation. This may represent atelectasis and/or scarring.   Follow up required: MRI/MRCP, chest CT.   Follow up should be done within 1 month(s)    Please notify the following clinician to assist with the follow up:   Dr. Peña    Incidental finding results were discussed with the Patient by Mariia Murcia MD on 12/17/24.   They expressed understanding and all questions answered.

## 2024-12-18 LAB
ATRIAL RATE: 86 BPM
P AXIS: 10 DEGREES
PR INTERVAL: 164 MS
QRS AXIS: -53 DEGREES
QRSD INTERVAL: 108 MS
QT INTERVAL: 388 MS
QTC INTERVAL: 464 MS
T WAVE AXIS: 36 DEGREES
VENTRICULAR RATE: 86 BPM

## 2024-12-18 PROCEDURE — 93010 ELECTROCARDIOGRAM REPORT: CPT | Performed by: INTERNAL MEDICINE

## 2025-02-10 ENCOUNTER — HOSPITAL ENCOUNTER (EMERGENCY)
Facility: HOSPITAL | Age: 70
Discharge: HOME | End: 2025-02-10
Attending: EMERGENCY MEDICINE | Admitting: EMERGENCY MEDICINE
Payer: MEDICARE

## 2025-02-10 ENCOUNTER — APPOINTMENT (EMERGENCY)
Dept: RADIOLOGY | Facility: HOSPITAL | Age: 70
End: 2025-02-10
Payer: MEDICARE

## 2025-02-10 VITALS
HEART RATE: 76 BPM | RESPIRATION RATE: 16 BRPM | OXYGEN SATURATION: 96 % | BODY MASS INDEX: 31.07 KG/M2 | SYSTOLIC BLOOD PRESSURE: 121 MMHG | DIASTOLIC BLOOD PRESSURE: 65 MMHG | TEMPERATURE: 97.8 F | WEIGHT: 204.37 LBS

## 2025-02-10 DIAGNOSIS — R05.9 COUGH: ICD-10-CM

## 2025-02-10 DIAGNOSIS — R03.0 ELEVATED BLOOD PRESSURE READING: ICD-10-CM

## 2025-02-10 DIAGNOSIS — R07.89 CHEST TIGHTNESS: Primary | ICD-10-CM

## 2025-02-10 LAB
2HR DELTA HS TROPONIN: 0 NG/L
ALBUMIN SERPL BCG-MCNC: 4 G/DL (ref 3.5–5)
ALP SERPL-CCNC: 52 U/L (ref 34–104)
ALT SERPL W P-5'-P-CCNC: 14 U/L (ref 7–52)
ANION GAP SERPL CALCULATED.3IONS-SCNC: 7 MMOL/L (ref 4–13)
AST SERPL W P-5'-P-CCNC: 15 U/L (ref 13–39)
BASOPHILS # BLD AUTO: 0.02 THOUSANDS/ΜL (ref 0–0.1)
BASOPHILS NFR BLD AUTO: 0 % (ref 0–1)
BILIRUB SERPL-MCNC: 0.46 MG/DL (ref 0.2–1)
BNP SERPL-MCNC: 92 PG/ML (ref 0–100)
BUN SERPL-MCNC: 17 MG/DL (ref 5–25)
CALCIUM SERPL-MCNC: 9 MG/DL (ref 8.4–10.2)
CARDIAC TROPONIN I PNL SERPL HS: 5 NG/L (ref ?–50)
CARDIAC TROPONIN I PNL SERPL HS: 5 NG/L (ref ?–50)
CHLORIDE SERPL-SCNC: 101 MMOL/L (ref 96–108)
CO2 SERPL-SCNC: 28 MMOL/L (ref 21–32)
CREAT SERPL-MCNC: 0.87 MG/DL (ref 0.6–1.3)
EOSINOPHIL # BLD AUTO: 0.08 THOUSAND/ΜL (ref 0–0.61)
EOSINOPHIL NFR BLD AUTO: 1 % (ref 0–6)
ERYTHROCYTE [DISTWIDTH] IN BLOOD BY AUTOMATED COUNT: 12.9 % (ref 11.6–15.1)
FLUAV AG UPPER RESP QL IA.RAPID: NEGATIVE
FLUBV AG UPPER RESP QL IA.RAPID: NEGATIVE
GFR SERPL CREATININE-BSD FRML MDRD: 87 ML/MIN/1.73SQ M
GLUCOSE SERPL-MCNC: 110 MG/DL (ref 65–140)
HCT VFR BLD AUTO: 38.9 % (ref 36.5–49.3)
HGB BLD-MCNC: 13 G/DL (ref 12–17)
IMM GRANULOCYTES # BLD AUTO: 0.04 THOUSAND/UL (ref 0–0.2)
IMM GRANULOCYTES NFR BLD AUTO: 0 % (ref 0–2)
LYMPHOCYTES # BLD AUTO: 1.19 THOUSANDS/ΜL (ref 0.6–4.47)
LYMPHOCYTES NFR BLD AUTO: 11 % (ref 14–44)
MAGNESIUM SERPL-MCNC: 2.3 MG/DL (ref 1.9–2.7)
MCH RBC QN AUTO: 27.2 PG (ref 26.8–34.3)
MCHC RBC AUTO-ENTMCNC: 33.4 G/DL (ref 31.4–37.4)
MCV RBC AUTO: 81 FL (ref 82–98)
MONOCYTES # BLD AUTO: 0.72 THOUSAND/ΜL (ref 0.17–1.22)
MONOCYTES NFR BLD AUTO: 7 % (ref 4–12)
NEUTROPHILS # BLD AUTO: 9.1 THOUSANDS/ΜL (ref 1.85–7.62)
NEUTS SEG NFR BLD AUTO: 81 % (ref 43–75)
NRBC BLD AUTO-RTO: 0 /100 WBCS
PLATELET # BLD AUTO: 281 THOUSANDS/UL (ref 149–390)
PMV BLD AUTO: 10.2 FL (ref 8.9–12.7)
POTASSIUM SERPL-SCNC: 4.1 MMOL/L (ref 3.5–5.3)
PROT SERPL-MCNC: 7.3 G/DL (ref 6.4–8.4)
RBC # BLD AUTO: 4.78 MILLION/UL (ref 3.88–5.62)
SARS-COV+SARS-COV-2 AG RESP QL IA.RAPID: NEGATIVE
SODIUM SERPL-SCNC: 136 MMOL/L (ref 135–147)
WBC # BLD AUTO: 11.15 THOUSAND/UL (ref 4.31–10.16)

## 2025-02-10 PROCEDURE — 36415 COLL VENOUS BLD VENIPUNCTURE: CPT

## 2025-02-10 PROCEDURE — 93005 ELECTROCARDIOGRAM TRACING: CPT

## 2025-02-10 PROCEDURE — 94640 AIRWAY INHALATION TREATMENT: CPT

## 2025-02-10 PROCEDURE — 99285 EMERGENCY DEPT VISIT HI MDM: CPT | Performed by: EMERGENCY MEDICINE

## 2025-02-10 PROCEDURE — 85025 COMPLETE CBC W/AUTO DIFF WBC: CPT

## 2025-02-10 PROCEDURE — 83735 ASSAY OF MAGNESIUM: CPT

## 2025-02-10 PROCEDURE — 83880 ASSAY OF NATRIURETIC PEPTIDE: CPT

## 2025-02-10 PROCEDURE — 87811 SARS-COV-2 COVID19 W/OPTIC: CPT

## 2025-02-10 PROCEDURE — 80053 COMPREHEN METABOLIC PANEL: CPT

## 2025-02-10 PROCEDURE — 84484 ASSAY OF TROPONIN QUANT: CPT

## 2025-02-10 PROCEDURE — 71046 X-RAY EXAM CHEST 2 VIEWS: CPT

## 2025-02-10 PROCEDURE — 99285 EMERGENCY DEPT VISIT HI MDM: CPT

## 2025-02-10 PROCEDURE — 87804 INFLUENZA ASSAY W/OPTIC: CPT

## 2025-02-10 RX ORDER — PREDNISONE 20 MG/1
60 TABLET ORAL ONCE
Status: COMPLETED | OUTPATIENT
Start: 2025-02-10 | End: 2025-02-10

## 2025-02-10 RX ORDER — IPRATROPIUM BROMIDE AND ALBUTEROL SULFATE 2.5; .5 MG/3ML; MG/3ML
3 SOLUTION RESPIRATORY (INHALATION) ONCE
Status: COMPLETED | OUTPATIENT
Start: 2025-02-10 | End: 2025-02-10

## 2025-02-10 RX ADMIN — PREDNISONE 60 MG: 20 TABLET ORAL at 13:32

## 2025-02-10 RX ADMIN — IPRATROPIUM BROMIDE AND ALBUTEROL SULFATE 3 ML: .5; 3 SOLUTION RESPIRATORY (INHALATION) at 12:33

## 2025-02-10 NOTE — ED ATTENDING ATTESTATION
2/10/2025  I, Curtis Castillo MD, saw and evaluated the patient. I have discussed the patient with the resident/non-physician practitioner and agree with the resident's/non-physician practitioner's findings, Plan of Care, and MDM as documented in the resident's/non-physician practitioner's note, except where noted. All available labs and Radiology studies were reviewed.  I was present for key portions of any procedure(s) performed by the resident/non-physician practitioner and I was immediately available to provide assistance.       At this point I agree with the current assessment done in the Emergency Department.  I have conducted an independent evaluation of this patient a history and physical is as follows:    70-year-old male presents to the emergency department for evaluation of a cough.  Patient reports that over the past week he has had a cough with associated subjective fever and chills.  Patient states that he has used his prescribed albuterol inhaler with only minimal relief so came to the emergency department for further evaluation.  On review of systems patient stating chest tightness to his left upper chest.  No nausea, vomiting or diarrhea.  No recent travel.  No back pain.  No localized numbness, tingling or weakness.    A 10 point review of systems was conducted and negative except for as stated above in HPI.    Physical Exam  Vitals and nursing note reviewed.   Constitutional:       General: He is not in acute distress.     Appearance: He is well-developed.   HENT:      Head: Normocephalic and atraumatic.   Eyes:      Conjunctiva/sclera: Conjunctivae normal.   Cardiovascular:      Rate and Rhythm: Normal rate and regular rhythm.      Pulses:           Radial pulses are 2+ on the right side and 2+ on the left side.        Dorsalis pedis pulses are 2+ on the right side and 2+ on the left side.      Heart sounds: No murmur heard.  Pulmonary:      Effort: Pulmonary effort is normal. No respiratory  distress.      Breath sounds: Wheezing present.   Abdominal:      Palpations: Abdomen is soft.      Tenderness: There is no abdominal tenderness.   Musculoskeletal:         General: No swelling.      Cervical back: Neck supple.      Right lower leg: Edema present.      Left lower leg: Edema present.   Skin:     General: Skin is warm and dry.      Capillary Refill: Capillary refill takes less than 2 seconds.   Neurological:      Mental Status: He is alert.   Psychiatric:         Mood and Affect: Mood normal.             ED Course     70-year-old male presented to the emergency department for evaluation of a cough.  On arrival patient awake, alert and in no acute distress.  On exam patient with mild wheezing bilaterally.  Patient treated with a DuoNeb and steroids.  EKG was ordered to evaluate for acute ischemia/arrhythmia.  EKG on my interpretation with a sinus rhythm with no acute ischemic changes.  Chest x-ray ordered to evaluate for acute cardiopulmonary process including but not limited to pneumonia, pneumothorax, edema, effusion.  Chest x-ray on my independent interpretation with no acute findings.  Blood work showed the patient had a mild leukocytosis but otherwise grossly unremarkable.  Delta troponin within normal limits.  All diagnostic studies were discussed with the patient in detail.  The patient is appropriate for discharge.  Recommendation was made for the patient to follow-up with his PCP.  Return precautions discussed.    The patient (and/or family present) agrees with the plan for discharge and feels comfortable to go home with proper follow-up. Diagnostic tests were reviewed. Diagnosis, care plan and treatment options were discussed. All questions were answered prior to discharge. I considered the patient's other medical conditions as applicable/noted above in my medical decision making. Advised the patient to return for worsening or additional problems. The patient (and/or family present)  verbalized understanding of the discharge instructions and warnings that would necessitate return to the Emergency Department.        Critical Care Time  Procedures

## 2025-02-10 NOTE — ED PROVIDER NOTES
Time reflects when diagnosis was documented in both MDM as applicable and the Disposition within this note       Time User Action Codes Description Comment    2/10/2025  1:08 PM Reid Guardado Add [R07.89] Chest tightness     2/10/2025  1:08 PM Reid Guardado Add [R03.0] Elevated blood pressure reading     2/10/2025  3:11 PM Reid Guardado Add [R05.9] Cough           ED Disposition       ED Disposition   Discharge    Condition   Stable    Date/Time   Mon Feb 10, 2025  3:11 PM    Comment   Dieudonne Andres Jr. discharge to home/self care.                   Assessment & Plan       Medical Decision Making  70-year-old male presenting to the ED with cough, chest tightness.    Concerning for flu, COVID, other viral illness, pneumonia, ACS.  Less suspicious for PE given cough is productive, patient has wheezing on exam, patient is not tachycardic or tachypneic and patient is on Eliquis.    Will get CBC, CMP, troponin, chest x-ray, EKG.,  Physical exam I did note leg swelling and 1+ pitting edema bilaterally.  Will add a BNP.    See ED course for interpretation of results.    Will initially start patient on DuoNeb and then reevaluate with lab work, if lab work is more specific for pulmonary in nature, will start steroids.    Patient feeling better after DuoNeb.  Following workup, discussed with patient following up with cardiology.  Strict return precautions given.  Patient comfortable with plan and comfortable with follow-up.  Patient at time of discharge stable with normotensive vitals, not tachycardic, not tachypneic, 96% on room air.    Patient discharged.    Amount and/or Complexity of Data Reviewed  Labs: ordered. Decision-making details documented in ED Course.  Radiology: ordered and independent interpretation performed.    Risk  Prescription drug management.        ED Course as of 02/10/25 1529   Mon Feb 10, 2025   1248 CBC and differential(!)  Mildly elevated white blood cell count with mildly  elevated neutrophil count, no signs of anemia.   1300 SARS COV Rapid Antigen: Negative   1300 Influenza A Rapid Antigen: Negative   1300 Influenza B Rapid Antigen: Negative   1306 Comprehensive metabolic panel  Unremarkable and reassuring.   1306 hs TnI 0hr: 5  Reassuring however will get delta based on symptom onset.   1307 MAGNESIUM: 2.3  Reassuring   1307 BNP: 92  Reassuring   1326 Will give prednisone.   1510 hs TnI 2hr: 5  reassuring       Medications   ipratropium-albuterol (DUO-NEB) 0.5-2.5 mg/3 mL inhalation solution 3 mL (3 mL Nebulization Given 2/10/25 1233)   predniSONE tablet 60 mg (60 mg Oral Given 2/10/25 1332)       ED Risk Strat Scores   HEART Risk Score      Flowsheet Row Most Recent Value   Heart Score Risk Calculator    History 1 Filed at: 02/10/2025 1307   ECG 1 Filed at: 02/10/2025 1307   Age 2 Filed at: 02/10/2025 1307   Risk Factors 1 Filed at: 02/10/2025 1307   Troponin 0 Filed at: 02/10/2025 1307   HEART Score 5 Filed at: 02/10/2025 1307          HEART Risk Score      Flowsheet Row Most Recent Value   Heart Score Risk Calculator    History 1 Filed at: 02/10/2025 1307   ECG 1 Filed at: 02/10/2025 1307   Age 2 Filed at: 02/10/2025 1307   Risk Factors 1 Filed at: 02/10/2025 1307   Troponin 0 Filed at: 02/10/2025 1307   HEART Score 5 Filed at: 02/10/2025 1307                            SBIRT 22yo+      Flowsheet Row Most Recent Value   Initial Alcohol Screen: US AUDIT-C     1. How often do you have a drink containing alcohol? 0 Filed at: 02/10/2025 1204   2. How many drinks containing alcohol do you have on a typical day you are drinking?  0 Filed at: 02/10/2025 1204   3b. FEMALE Any Age, or MALE 65+: How often do you have 4 or more drinks on one occassion? 0 Filed at: 02/10/2025 1204   Audit-C Score 0 Filed at: 02/10/2025 1204   NOEL: How many times in the past year have you...    Used an illegal drug or used a prescription medication for non-medical reasons? Never Filed at: 02/10/2025 3684                             History of Present Illness       Chief Complaint   Patient presents with    Cough     Pt states cough x4 days, states he took 1.5 doses of Neb treatment lastnight. Chest tightness starting lastnight. Denies OTC medications today. Did take morning medications this morning.        Past Medical History:   Diagnosis Date    Asthma     DVT (deep venous thrombosis) (HCC)     left arm    Hypertension       Past Surgical History:   Procedure Laterality Date    REPAIR TENDONS LEG Left     ROTATOR CUFF REPAIR Left       History reviewed. No pertinent family history.   Social History     Tobacco Use    Smoking status: Never    Smokeless tobacco: Never   Substance Use Topics    Alcohol use: Yes     Comment: seldom    Drug use: No      E-Cigarette/Vaping      E-Cigarette/Vaping Substances      I have reviewed and agree with the history as documented.     70-year-old male with significant history of chronic DVT on Eliquis, asthma, and hypertension presenting to the ED for complaints of cough, fever, chills for the past week.  Patient states he does have issues with recurrent bronchitis however this week his symptoms have not resolved with his albuterol use and he started noting chills over the past 2 days.  Patient does note that he is having green mucus production with his cough.  Patient also notes chest tightness in the left upper region of his chest.        Review of Systems   Constitutional:  Positive for chills and fever (Subjective).   HENT:  Positive for congestion, rhinorrhea and sore throat (Mild).    Respiratory:  Positive for cough, chest tightness and wheezing. Negative for shortness of breath and stridor.    Cardiovascular:  Positive for leg swelling. Negative for chest pain and palpitations.   Gastrointestinal:  Negative for abdominal pain, constipation, diarrhea, nausea and vomiting.   Genitourinary:  Negative for dysuria.   Musculoskeletal:  Negative for back pain and neck pain.   Skin:   Negative for color change.   Neurological:  Negative for dizziness, weakness, light-headedness and headaches.   Psychiatric/Behavioral:  Negative for agitation and confusion.            Objective       ED Triage Vitals [02/10/25 1205]   Temperature Pulse Blood Pressure Respirations SpO2 Patient Position - Orthostatic VS   97.8 °F (36.6 °C) 80 161/74 18 96 % Lying      Temp Source Heart Rate Source BP Location FiO2 (%) Pain Score    Oral Monitor Left arm -- No Pain      Vitals      Date and Time Temp Pulse SpO2 Resp BP Pain Score FACES Pain Rating User   02/10/25 1431 -- 76 96 % 16 121/65 -- -- CF   02/10/25 1330 -- 75 95 % 18 140/65 -- -- CH   02/10/25 1205 97.8 °F (36.6 °C) 80 96 % 18 161/74 No Pain -- FN            Physical Exam  Constitutional:       Appearance: Normal appearance.   HENT:      Head: Normocephalic and atraumatic.      Right Ear: Tympanic membrane, ear canal and external ear normal.      Left Ear: Tympanic membrane, ear canal and external ear normal.      Nose: Nose normal.      Mouth/Throat:      Mouth: Mucous membranes are moist.      Pharynx: Oropharynx is clear. No oropharyngeal exudate or posterior oropharyngeal erythema.      Comments: Patient mentions sore throat but states it is mild, does not feel like shards of glass or sharp sensation.  No exudate or erythema.  Eyes:      Extraocular Movements: Extraocular movements intact.      Pupils: Pupils are equal, round, and reactive to light.   Cardiovascular:      Rate and Rhythm: Normal rate and regular rhythm.      Pulses: Normal pulses.      Heart sounds: Normal heart sounds.   Pulmonary:      Effort: Pulmonary effort is normal.      Breath sounds: Wheezing present. No rhonchi or rales.   Chest:      Chest wall: No tenderness.   Abdominal:      General: Bowel sounds are normal.      Palpations: Abdomen is soft.   Musculoskeletal:         General: Normal range of motion.      Cervical back: Normal range of motion.      Right lower le+  Pitting Edema present.      Left lower le+ Pitting Edema present.   Skin:     General: Skin is warm.      Capillary Refill: Capillary refill takes less than 2 seconds.   Neurological:      General: No focal deficit present.      Mental Status: He is alert and oriented to person, place, and time.   Psychiatric:         Mood and Affect: Mood normal.         Behavior: Behavior normal.         Results Reviewed       Procedure Component Value Units Date/Time    HS Troponin I 2hr [[id number]]  (Normal) Collected: 02/10/25 1430    Lab Status: Final result Specimen: Blood from Arm, Left Updated: 02/10/25 1458     hs TnI 2hr 5 ng/L      Delta 2hr hsTnI 0 ng/L     B-Type Natriuretic Peptide(BNP) [[id number]]  (Normal) Collected: 02/10/25 1230    Lab Status: Final result Specimen: Blood from Arm, Left Updated: 02/10/25 1305     BNP 92 pg/mL     Comprehensive metabolic panel [[id number]] Collected: 02/10/25 1230    Lab Status: Final result Specimen: Blood from Arm, Left Updated: 02/10/25 1303     Sodium 136 mmol/L      Potassium 4.1 mmol/L      Chloride 101 mmol/L      CO2 28 mmol/L      ANION GAP 7 mmol/L      BUN 17 mg/dL      Creatinine 0.87 mg/dL      Glucose 110 mg/dL      Calcium 9.0 mg/dL      AST 15 U/L      ALT 14 U/L      Alkaline Phosphatase 52 U/L      Total Protein 7.3 g/dL      Albumin 4.0 g/dL      Total Bilirubin 0.46 mg/dL      eGFR 87 ml/min/1.73sq m     Narrative:      National Kid[employer] Disease Foundation guidelines for Chronic Kidney Disease (CKD):     Stage 1 with normal or high GFR (GFR > 90 mL/min/1.73 square meters)    Stage 2 Mild CKD (GFR = 60-89 mL/min/1.73 square meters)    Stage 3A Moderate CKD (GFR = 45-59 mL/min/1.73 square meters)    Stage 3B Moderate CKD (GFR = 30-44 mL/min/1.73 square meters)    Stage 4 Severe CKD (GFR = 15-29 mL/min/1.73 square meters)    Stage 5 End Stage CKD (GFR <15 mL/min/1.73 square meters)  Note: GFR calculation is accurate only with a steady state creatinine    Magnesium  [[id number]]  (Normal) Collected: 02/10/25 1230    Lab Status: Final result Specimen: Blood from Arm, Left Updated: 02/10/25 1303     Magnesium 2.3 mg/dL     HS Troponin 0hr (reflex protocol) [[id number]]  (Normal) Collected: 02/10/25 1230    Lab Status: Final result Specimen: Blood from Arm, Left Updated: 02/10/25 1300     hs TnI 0hr 5 ng/L     FLU/COVID Rapid Antigen (30 min. TAT) - Preferred screening test in ED [[id number]]  (Normal) Collected: 02/10/25 1230    Lab Status: Final result Specimen: Nares from Nose Updated: 02/10/25 1253     SARS COV Rapid Antigen Negative     Influenza A Rapid Antigen Negative     Influenza B Rapid Antigen Negative    Narrative:      This test has been performed using the [employer] Jennifer 2 FLU+SARS Antigen test under the Emergency Use Authorization (EUA). This test has been validated by the  and verified by the performing laboratory. The Jennifer uses lateral flow immunofluorescent sandwich assay to detect SARS-COV, Influenza A and Influenza B Antigen.     The [employer] Jennifer 2 SARS Antigen test does not differentiate between SARS-CoV and SARS-CoV-2.     Negative results are presumptive and may be confirmed with a molecular assay, if necessary, for patient management. Negative results do not rule out SARS-CoV-2 or influenza infection and should not be used as the sole basis for treatment or patient management decisions. A negative test result may occur if the level of antigen in a sample is below the limit of detection of this test.     Positive results are indicative of the presence of viral antigens, but do not rule out bacterial infection or co-infection with other viruses.     All test results should be used as an adjunct to clinical observations and other information available to the provider.    FOR PEDIATRIC PATIENTS - copy/paste COVID Guidelines URL to browser: [email or web address]    CBC and differential [[id number]]   (Abnormal) Collected: 02/10/25 1230    Lab Status: Final result Specimen: Blood from Arm, Left Updated: 02/10/25 1238     WBC 11.15 Thousand/uL      RBC 4.78 Million/uL      Hemoglobin 13.0 g/dL      Hematocrit 38.9 %      MCV 81 fL      MCH 27.2 pg      MCHC 33.4 g/dL      RDW 12.9 %      MPV 10.2 fL      Platelets 281 Thousands/uL      nRBC 0 /100 WBCs      Segmented % 81 %      Immature Grans % 0 %      Lymphocytes % 11 %      Monocytes % 7 %      Eosinophils Relative 1 %      Basophils Relative 0 %      Absolute Neutrophils 9.10 Thousands/µL      Absolute Immature Grans 0.04 Thousand/uL      Absolute Lymphocytes 1.19 Thousands/µL      Absolute Monocytes 0.72 Thousand/µL      Eosinophils Absolute 0.08 Thousand/µL      Basophils Absolute 0.02 Thousands/µL             XR chest 2 views   ED Interpretation by Reid Salmeron MD (02/10 0210)   No acute cardiopulmonary disease          ECG 12 Lead Documentation Only    Date/Time: 2/10/2025 12:28 PM    Performed by: Reid Salmeron MD  Authorized by: Reid Salmeron MD    Indications / Diagnosis:  Chest tightness  ECG reviewed by me, the ED Provider: yes    Patient location:  ED  Previous ECG:     Previous ECG:  Compared to current    Similarity:  No change  Interpretation:     Interpretation: abnormal    Rate:     ECG rate:  76    ECG rate assessment: normal    Rhythm:     Rhythm: sinus rhythm    Ectopy:     Ectopy: none    QRS:     QRS axis:  Left  Conduction:     Conduction: abnormal      Abnormal conduction: incomplete RBBB    ST segments:     ST segments:  Normal  T waves:     T waves: normal        ED Medication and Procedure Management   Prior to Admission Medications   Prescriptions Last Dose Informant Patient Reported? Taking?   ALBUTEROL SULFATE IN  Self Yes No   Sig: Inhale.   Apixaban (ELIQUIS PO)  Self Yes No   Sig: Take 5 mg by mouth in the morning   MINOXIDIL PO  Self Yes No   Sig: Take 10 mg by mouth 2 (two) times a day    Testosterone (ANDROGEL  PUMP TD)  Self Yes No   Sig: Place on the skin in the morning   budesonide-formoterol (SYMBICORT) 160-4.5 mcg/act inhaler  Self Yes No   Sig: Inhale 2 puffs 2 (two) times a day.   Patient not taking: Reported on 10/2/2024   dicyclomine (BENTYL) 20 mg tablet   No No   Sig: Take 1 tablet (20 mg total) by mouth every 6 (six) hours as needed (abdominal pain) for up to 5 days   loratadine (CLARITIN) 10 mg tablet  Self Yes No   Sig: Take 10 mg by mouth daily.   Patient not taking: Reported on 10/2/2024   montelukast (SINGULAIR) 10 mg tablet  Self Yes No   Sig: Take 10 mg by mouth daily at bedtime   Patient not taking: Reported on 10/2/2024   ondansetron (ZOFRAN) 4 mg tablet   No No   Sig: Take 1 tablet (4 mg total) by mouth every 8 (eight) hours as needed for nausea or vomiting for up to 5 days   ondansetron (Zofran ODT) 4 mg disintegrating tablet  Self No No   Sig: Take 1 tablet (4 mg total) by mouth every 6 (six) hours as needed for nausea or vomiting   Patient not taking: Reported on 10/2/2024   promethazine-dextromethorphan (PHENERGAN-DM) 6.25-15 mg/5 mL oral syrup  Self Yes No   Sig: TAKE ONE TEASPOON BY MOUTH EVERY 6 HOURS   Patient not taking: Reported on 10/2/2024      Facility-Administered Medications: None     Patient's Medications   Discharge Prescriptions    No medications on file       ED SEPSIS DOCUMENTATION   Time reflects when diagnosis was documented in both MDM as applicable and the Disposition within this note       Time User Action Codes Description Comment    2/10/2025  1:08 PM Reid Salmeron [R07.89] Chest tightness     2/10/2025  1:08 PM Reid Salmeron [R03.0] Elevated blood pressure reading     2/10/2025  3:11 PM Reid Salmeron [R05.9] Cough                  Reid Salmeron MD  02/10/25 8812

## 2025-02-10 NOTE — DISCHARGE INSTRUCTIONS
Today we assessed you for your cough and chest tightness.  Your cardiac workup was fairly unremarkable.  However we do complete a score called a heart score which takes in consideration of our evaluation and different lab findings.  Your heart score was elevated which is nonspecific for any 1 thing however does recommend that you follow-up with cardiology.  A referral has been placed.  Please follow-up with cardiology in the next week for an appointment and discuss your further symptom care with your primary care provider.

## 2025-02-11 LAB
ATRIAL RATE: 76 BPM
P AXIS: -4 DEGREES
PR INTERVAL: 150 MS
QRS AXIS: -33 DEGREES
QRSD INTERVAL: 106 MS
QT INTERVAL: 416 MS
QTC INTERVAL: 468 MS
T WAVE AXIS: 51 DEGREES
VENTRICULAR RATE: 76 BPM

## 2025-02-11 PROCEDURE — 93010 ELECTROCARDIOGRAM REPORT: CPT | Performed by: INTERNAL MEDICINE

## 2025-02-19 ENCOUNTER — APPOINTMENT (OUTPATIENT)
Dept: LAB | Facility: CLINIC | Age: 70
End: 2025-02-19
Payer: MEDICARE

## 2025-02-19 DIAGNOSIS — E78.2 MIXED HYPERLIPIDEMIA: ICD-10-CM

## 2025-02-19 DIAGNOSIS — Z12.5 SPECIAL SCREENING, PROSTATE CANCER: ICD-10-CM

## 2025-02-19 DIAGNOSIS — I10 ESSENTIAL HYPERTENSION, MALIGNANT: ICD-10-CM

## 2025-02-19 DIAGNOSIS — Z79.899 NEED FOR PROPHYLACTIC CHEMOTHERAPY: ICD-10-CM

## 2025-02-19 LAB
25(OH)D3 SERPL-MCNC: 27.3 NG/ML (ref 30–100)
ALBUMIN SERPL BCG-MCNC: 3.9 G/DL (ref 3.5–5)
ALP SERPL-CCNC: 49 U/L (ref 34–104)
ALT SERPL W P-5'-P-CCNC: 14 U/L (ref 7–52)
ANION GAP SERPL CALCULATED.3IONS-SCNC: 4 MMOL/L (ref 4–13)
AST SERPL W P-5'-P-CCNC: 15 U/L (ref 13–39)
BACTERIA UR QL AUTO: NORMAL /HPF
BASOPHILS # BLD AUTO: 0.04 THOUSANDS/ΜL (ref 0–0.1)
BASOPHILS NFR BLD AUTO: 0 % (ref 0–1)
BILIRUB SERPL-MCNC: 0.41 MG/DL (ref 0.2–1)
BILIRUB UR QL STRIP: NEGATIVE
BUN SERPL-MCNC: 17 MG/DL (ref 5–25)
CALCIUM SERPL-MCNC: 8.8 MG/DL (ref 8.4–10.2)
CHLORIDE SERPL-SCNC: 102 MMOL/L (ref 96–108)
CHOLEST SERPL-MCNC: 166 MG/DL (ref ?–200)
CLARITY UR: CLEAR
CO2 SERPL-SCNC: 31 MMOL/L (ref 21–32)
COLOR UR: YELLOW
CREAT SERPL-MCNC: 0.83 MG/DL (ref 0.6–1.3)
EOSINOPHIL # BLD AUTO: 0.17 THOUSAND/ΜL (ref 0–0.61)
EOSINOPHIL NFR BLD AUTO: 2 % (ref 0–6)
ERYTHROCYTE [DISTWIDTH] IN BLOOD BY AUTOMATED COUNT: 13.2 % (ref 11.6–15.1)
ERYTHROCYTE [SEDIMENTATION RATE] IN BLOOD: 32 MM/HOUR (ref 0–19)
GFR SERPL CREATININE-BSD FRML MDRD: 89 ML/MIN/1.73SQ M
GLUCOSE P FAST SERPL-MCNC: 88 MG/DL (ref 65–99)
GLUCOSE UR STRIP-MCNC: NEGATIVE MG/DL
HCT VFR BLD AUTO: 40.3 % (ref 36.5–49.3)
HDLC SERPL-MCNC: 49 MG/DL
HGB BLD-MCNC: 13.4 G/DL (ref 12–17)
HGB UR QL STRIP.AUTO: NEGATIVE
IMM GRANULOCYTES # BLD AUTO: 0.05 THOUSAND/UL (ref 0–0.2)
IMM GRANULOCYTES NFR BLD AUTO: 1 % (ref 0–2)
KETONES UR STRIP-MCNC: NEGATIVE MG/DL
LDLC SERPL CALC-MCNC: 102 MG/DL (ref 0–100)
LEUKOCYTE ESTERASE UR QL STRIP: NEGATIVE
LYMPHOCYTES # BLD AUTO: 1.65 THOUSANDS/ΜL (ref 0.6–4.47)
LYMPHOCYTES NFR BLD AUTO: 17 % (ref 14–44)
MCH RBC QN AUTO: 27.1 PG (ref 26.8–34.3)
MCHC RBC AUTO-ENTMCNC: 33.3 G/DL (ref 31.4–37.4)
MCV RBC AUTO: 82 FL (ref 82–98)
MONOCYTES # BLD AUTO: 0.57 THOUSAND/ΜL (ref 0.17–1.22)
MONOCYTES NFR BLD AUTO: 6 % (ref 4–12)
NEUTROPHILS # BLD AUTO: 7.21 THOUSANDS/ΜL (ref 1.85–7.62)
NEUTS SEG NFR BLD AUTO: 74 % (ref 43–75)
NITRITE UR QL STRIP: NEGATIVE
NON-SQ EPI CELLS URNS QL MICRO: NORMAL /HPF
NONHDLC SERPL-MCNC: 117 MG/DL
NRBC BLD AUTO-RTO: 0 /100 WBCS
PH UR STRIP.AUTO: 6.5 [PH]
PLATELET # BLD AUTO: 389 THOUSANDS/UL (ref 149–390)
PMV BLD AUTO: 9.5 FL (ref 8.9–12.7)
POTASSIUM SERPL-SCNC: 4 MMOL/L (ref 3.5–5.3)
PROT SERPL-MCNC: 7.2 G/DL (ref 6.4–8.4)
PROT UR STRIP-MCNC: NEGATIVE MG/DL
PSA SERPL-MCNC: 0.12 NG/ML (ref 0–4)
RBC # BLD AUTO: 4.94 MILLION/UL (ref 3.88–5.62)
RBC #/AREA URNS AUTO: NORMAL /HPF
SODIUM SERPL-SCNC: 137 MMOL/L (ref 135–147)
SP GR UR STRIP.AUTO: 1.02 (ref 1–1.03)
TRIGL SERPL-MCNC: 74 MG/DL (ref ?–150)
TSH SERPL DL<=0.05 MIU/L-ACNC: 0.89 UIU/ML (ref 0.45–4.5)
UROBILINOGEN UR STRIP-ACNC: <2 MG/DL
WBC # BLD AUTO: 9.69 THOUSAND/UL (ref 4.31–10.16)
WBC #/AREA URNS AUTO: NORMAL /HPF

## 2025-02-19 PROCEDURE — 84443 ASSAY THYROID STIM HORMONE: CPT

## 2025-02-19 PROCEDURE — 85025 COMPLETE CBC W/AUTO DIFF WBC: CPT

## 2025-02-19 PROCEDURE — 85652 RBC SED RATE AUTOMATED: CPT

## 2025-02-19 PROCEDURE — 80061 LIPID PANEL: CPT

## 2025-02-19 PROCEDURE — 80053 COMPREHEN METABOLIC PANEL: CPT

## 2025-02-19 PROCEDURE — 82306 VITAMIN D 25 HYDROXY: CPT

## 2025-02-19 PROCEDURE — 36415 COLL VENOUS BLD VENIPUNCTURE: CPT

## 2025-02-19 PROCEDURE — 82378 CARCINOEMBRYONIC ANTIGEN: CPT

## 2025-02-19 PROCEDURE — G0103 PSA SCREENING: HCPCS

## 2025-02-19 PROCEDURE — 86301 IMMUNOASSAY TUMOR CA 19-9: CPT

## 2025-02-19 PROCEDURE — 82607 VITAMIN B-12: CPT

## 2025-02-19 PROCEDURE — 81001 URINALYSIS AUTO W/SCOPE: CPT

## 2025-02-20 LAB
CANCER AG19-9 SERPL-ACNC: 6 U/ML (ref 0–35)
CEA SERPL-MCNC: 1 NG/ML (ref 0–3)
VIT B12 SERPL-MCNC: 248 PG/ML (ref 180–914)

## 2025-04-30 ENCOUNTER — TELEPHONE (OUTPATIENT)
Age: 70
End: 2025-04-30

## 2025-04-30 NOTE — H&P (VIEW-ONLY)
Colon and Rectal Surgery   Dieudonne Andres Jr. 70 y.o. male MRN [id number]  Encounter: [id number]  05/01/25 10:43 AM            Assessment: Dieudonne Andres Jr. is a 70 y.o. male who has anal bleeding and a history of colon polyps.      Plan:   Anal bleeding  Bleeding is occasional and otherwise asymptomatic.    The digital exam and anoscopy show normal hemorrhoidal structures.  There are tiny tags but they are relatively insignificant.  No obvious source of bleeding is seen.  The prostate is broadened but nonnodular.    Colonoscopy is recommended.  Colonoscopy risks, not limited to bleeding, perforated colon, need for surgery, and missed lesions were discussed. Alternatives were discussed. Questions were answered. He agreed to the procedure.        Subjective     HPI    Dieudonne Andres Jr. is a 70 y.o. male who presents for consultation prior to colonoscopy scheduling.     The patient notes episodes of painless bright red rectal bleeding in the toilet a month ago and yesterday. The patient would like to do do some blood tests prior to colonoscopy: Epi Pro Colon and Shield blood test.     Last colonoscopy performed on 2/5/2020, with a 5 year recall due to a personal history of colon polyps.     Historical Information   Past Medical History:   Diagnosis Date    Asthma     DVT (deep venous thrombosis) (HCC)     left arm    Hypertension      Past Surgical History:   Procedure Laterality Date    REPAIR TENDONS LEG Left     ROTATOR CUFF REPAIR Left        Meds/Allergies       Current Outpatient Medications:     ALBUTEROL SULFATE IN, Inhale., Disp: , Rfl:     NON FORMULARY, Inhale 1 puff 2 (two) times a day FLUTICASONE-SALMETEROL (WIXELA INHUB) 100-50MCG/PUFF, Disp: , Rfl:     NON FORMULARY, Use Nasal Spray, Disp: , Rfl:     Testosterone (ANDROGEL PUMP TD), Place on the skin in the morning, Disp: , Rfl:     budesonide-formoterol (SYMBICORT) 160-4.5 mcg/act inhaler, Inhale 2 puffs 2 (two) times a day. (Patient not taking:  "Reported on 10/2/2024), Disp: , Rfl:     dicyclomine (BENTYL) 20 mg tablet, Take 1 tablet (20 mg total) by mouth every 6 (six) hours as needed (abdominal pain) for up to 5 days, Disp: 20 tablet, Rfl: 0    loratadine (CLARITIN) 10 mg tablet, Take 10 mg by mouth daily. (Patient not taking: Reported on 10/2/2024), Disp: , Rfl:     MINOXIDIL PO, Take 10 mg by mouth 2 (two) times a day , Disp: , Rfl:     montelukast (SINGULAIR) 10 mg tablet, Take 10 mg by mouth daily at bedtime (Patient not taking: Reported on 10/2/2024), Disp: , Rfl:     ondansetron (Zofran ODT) 4 mg disintegrating tablet, Take 1 tablet (4 mg total) by mouth every 6 (six) hours as needed for nausea or vomiting (Patient not taking: Reported on 10/2/2024), Disp: 20 tablet, Rfl: 0    ondansetron (ZOFRAN) 4 mg tablet, Take 1 tablet (4 mg total) by mouth every 8 (eight) hours as needed for nausea or vomiting for up to 5 days, Disp: 15 tablet, Rfl: 0    promethazine-dextromethorphan (PHENERGAN-DM) 6.25-15 mg/5 mL oral syrup, TAKE ONE TEASPOON BY MOUTH EVERY 6 HOURS (Patient not taking: Reported on 10/2/2024), Disp: , Rfl:   No Known Allergies    Social History   Social History     Substance and Sexual Activity   Drug Use No     Social History     Tobacco Use   Smoking Status Never   Smokeless Tobacco Never         Family History   Problem Relation Age of Onset    Lung cancer Mother          Review of Systems   Constitutional: Negative.    Respiratory: Negative.     Cardiovascular: Negative.    Gastrointestinal:  Positive for anal bleeding. Negative for abdominal distention, abdominal pain, blood in stool, constipation and diarrhea.       Objective   Current Vitals:  Vitals:    05/01/25 1015   Weight: 94.8 kg (209 lb)   Height: 5' 8\" (1.727 m)         Physical Exam  Constitutional:       Appearance: Normal appearance.   Cardiovascular:      Rate and Rhythm: Normal rate and regular rhythm.   Pulmonary:      Effort: Pulmonary effort is normal.      Breath " sounds: Normal breath sounds.   Genitourinary:     Comments: The digital exam and anoscopy show normal hemorrhoidal structures.  There are tiny tags but they are relatively insignificant.  No obvious source of bleeding is seen.  The prostate is broadened but nonnodular.  Neurological:      General: No focal deficit present.      Mental Status: He is alert and oriented to person, place, and time.     Lower Endoscopy    Date/Time: 5/1/2025 10:00 AM    Performed by: CARLA Montgomery MD  Authorized by: CARLA Montgomery MD    Verbal consent obtained?: Yes    Consent given by:  Patient  Scope type:  Anoscope   The digital exam and anoscopy show normal hemorrhoidal structures.  There are tiny tags but they are relatively insignificant.  No obvious source of bleeding is seen.  The prostate is broadened but nonnodular.

## 2025-04-30 NOTE — PROGRESS NOTES
Colon and Rectal Surgery   Dieudonne Andres Jr. 70 y.o. male MRN [id number]  Encounter: [id number]  05/01/25 10:43 AM            Assessment: Dieudonne Andres Jr. is a 70 y.o. male who has anal bleeding and a history of colon polyps.      Plan:   Anal bleeding  Bleeding is occasional and otherwise asymptomatic.    The digital exam and anoscopy show normal hemorrhoidal structures.  There are tiny tags but they are relatively insignificant.  No obvious source of bleeding is seen.  The prostate is broadened but nonnodular.    Colonoscopy is recommended.  Colonoscopy risks, not limited to bleeding, perforated colon, need for surgery, and missed lesions were discussed. Alternatives were discussed. Questions were answered. He agreed to the procedure.        Subjective     HPI    Dieudonne Andres Jr. is a 70 y.o. male who presents for consultation prior to colonoscopy scheduling.     The patient notes episodes of painless bright red rectal bleeding in the toilet a month ago and yesterday. The patient would like to do do some blood tests prior to colonoscopy: Epi Pro Colon and Shield blood test.     Last colonoscopy performed on 2/5/2020, with a 5 year recall due to a personal history of colon polyps.     Historical Information   Past Medical History:   Diagnosis Date    Asthma     DVT (deep venous thrombosis) (HCC)     left arm    Hypertension      Past Surgical History:   Procedure Laterality Date    REPAIR TENDONS LEG Left     ROTATOR CUFF REPAIR Left        Meds/Allergies       Current Outpatient Medications:     ALBUTEROL SULFATE IN, Inhale., Disp: , Rfl:     NON FORMULARY, Inhale 1 puff 2 (two) times a day FLUTICASONE-SALMETEROL (WIXELA INHUB) 100-50MCG/PUFF, Disp: , Rfl:     NON FORMULARY, Use Nasal Spray, Disp: , Rfl:     Testosterone (ANDROGEL PUMP TD), Place on the skin in the morning, Disp: , Rfl:     budesonide-formoterol (SYMBICORT) 160-4.5 mcg/act inhaler, Inhale 2 puffs 2 (two) times a day. (Patient not taking:  "Reported on 10/2/2024), Disp: , Rfl:     dicyclomine (BENTYL) 20 mg tablet, Take 1 tablet (20 mg total) by mouth every 6 (six) hours as needed (abdominal pain) for up to 5 days, Disp: 20 tablet, Rfl: 0    loratadine (CLARITIN) 10 mg tablet, Take 10 mg by mouth daily. (Patient not taking: Reported on 10/2/2024), Disp: , Rfl:     MINOXIDIL PO, Take 10 mg by mouth 2 (two) times a day , Disp: , Rfl:     montelukast (SINGULAIR) 10 mg tablet, Take 10 mg by mouth daily at bedtime (Patient not taking: Reported on 10/2/2024), Disp: , Rfl:     ondansetron (Zofran ODT) 4 mg disintegrating tablet, Take 1 tablet (4 mg total) by mouth every 6 (six) hours as needed for nausea or vomiting (Patient not taking: Reported on 10/2/2024), Disp: 20 tablet, Rfl: 0    ondansetron (ZOFRAN) 4 mg tablet, Take 1 tablet (4 mg total) by mouth every 8 (eight) hours as needed for nausea or vomiting for up to 5 days, Disp: 15 tablet, Rfl: 0    promethazine-dextromethorphan (PHENERGAN-DM) 6.25-15 mg/5 mL oral syrup, TAKE ONE TEASPOON BY MOUTH EVERY 6 HOURS (Patient not taking: Reported on 10/2/2024), Disp: , Rfl:   No Known Allergies    Social History   Social History     Substance and Sexual Activity   Drug Use No     Social History     Tobacco Use   Smoking Status Never   Smokeless Tobacco Never         Family History   Problem Relation Age of Onset    Lung cancer Mother          Review of Systems   Constitutional: Negative.    Respiratory: Negative.     Cardiovascular: Negative.    Gastrointestinal:  Positive for anal bleeding. Negative for abdominal distention, abdominal pain, blood in stool, constipation and diarrhea.       Objective   Current Vitals:  Vitals:    05/01/25 1015   Weight: 94.8 kg (209 lb)   Height: 5' 8\" (1.727 m)         Physical Exam  Constitutional:       Appearance: Normal appearance.   Cardiovascular:      Rate and Rhythm: Normal rate and regular rhythm.   Pulmonary:      Effort: Pulmonary effort is normal.      Breath " sounds: Normal breath sounds.   Genitourinary:     Comments: The digital exam and anoscopy show normal hemorrhoidal structures.  There are tiny tags but they are relatively insignificant.  No obvious source of bleeding is seen.  The prostate is broadened but nonnodular.  Neurological:      General: No focal deficit present.      Mental Status: He is alert and oriented to person, place, and time.     Lower Endoscopy    Date/Time: 5/1/2025 10:00 AM    Performed by: CARLA Montgomery MD  Authorized by: CARLA Montgomery MD    Verbal consent obtained?: Yes    Consent given by:  Patient  Scope type:  Anoscope   The digital exam and anoscopy show normal hemorrhoidal structures.  There are tiny tags but they are relatively insignificant.  No obvious source of bleeding is seen.  The prostate is broadened but nonnodular.

## 2025-04-30 NOTE — TELEPHONE ENCOUNTER
The patient called in to schedule a colonoscopy; however, after reviewing the chart, the patient has Eliquis listed as a medication. He advised that he is not currently on the medication and stopped back in February, but he also recently had surgery, so we just scheduled an office visit for precautionary reasons.

## 2025-05-01 ENCOUNTER — OFFICE VISIT (OUTPATIENT)
Age: 70
End: 2025-05-01
Payer: MEDICARE

## 2025-05-01 ENCOUNTER — TELEPHONE (OUTPATIENT)
Age: 70
End: 2025-05-01

## 2025-05-01 VITALS — HEIGHT: 68 IN | BODY MASS INDEX: 31.67 KG/M2 | WEIGHT: 209 LBS

## 2025-05-01 DIAGNOSIS — K62.5 ANAL BLEEDING: Primary | ICD-10-CM

## 2025-05-01 PROCEDURE — 46600 DIAGNOSTIC ANOSCOPY SPX: CPT | Performed by: COLON & RECTAL SURGERY

## 2025-05-01 PROCEDURE — 99203 OFFICE O/P NEW LOW 30 MIN: CPT | Performed by: COLON & RECTAL SURGERY

## 2025-05-01 NOTE — TELEPHONE ENCOUNTER
Spoke with patient to schedule colonoscopy vs consult today. Patient would like to keep his OV for today.

## 2025-05-01 NOTE — TELEPHONE ENCOUNTER
OV 5/1/2025, colon consult; BMI 31    Patient scheduled for 5/7/25 at  w/ TR, instructions handed to patient.     ----- Message from CARLA Montgomery MD sent at 5/1/2025 10:43 AM EDT -----  colonoscopy

## 2025-05-01 NOTE — ASSESSMENT & PLAN NOTE
Bleeding is occasional and otherwise asymptomatic.    The digital exam and anoscopy show normal hemorrhoidal structures.  There are tiny tags but they are relatively insignificant.  No obvious source of bleeding is seen.  The prostate is broadened but nonnodular.    Colonoscopy is recommended.  Colonoscopy risks, not limited to bleeding, perforated colon, need for surgery, and missed lesions were discussed. Alternatives were discussed. Questions were answered. He agreed to the procedure.

## 2025-05-01 NOTE — LETTER
Dieudonne Andres Jr.  [address] [address]        Location:  [place] [address]    Performing Physician: KASSIDY Montgomery MD      DATE OF PROCEDURE: 5/7/2025 TIME OF PROCEDURE:                                 The OR/GI Lab will contact you the evening prior to your procedure with your exact arrival time.    We kindly ask that you immediately notify us of any need to cancel or reschedule your procedure.      WEEK BEFORE THE PROCEDURE:  Do not take Iron tablets for one week  5 days prior to the appointment AVOID vegetables and fruits with skins or seeds, nuts, corn, popcorn, and whole grain breads.  Purchase: One (1) 238-gram container of Miralax (polyethylene glycol 3350), four (4) 5 mg Dulcolax (bisacodyl) tablets, and 64-ounces of Gatorade (sports drink) - no red, orange, or purple. These may be purchased at any pharmacy without a prescription. Generic products are permissible.  Arrange responsible transportation for day of the procedure.      DAY BEFORE THE PROCEDURE:  CLEAR liquids only for entire day prior. Nothing red, orange or purple.  You MAY have:  Soda  Water  Broth Gatorade  Jell-O  Popsicles Coffee/tea without  Milk/creamer     YOU MAY NOT HAVE:  Solid foods  Milk and milk products  Juice with pulp              BOWEL PREPARATION: Includes: One (1) 238-gram container of Miralax (polyethylene glycol 3350), four (4) 5 mg Dulcolax (bisacodyl) tablets, and 64-ounces of Gatorade (sports drink). Preparation may be refrigerated. Entire bowel prep should be completed.    Afternoon before the procedure (2:00 pm - 5:00 pm):  Take two (2) 5 mg Dulcolax laxative tablets.    Evening before the procedure (6:00 pm):  Mix entire container of Miralax with 64-ounces of Gatorade and shake until all medication is dissolved.  Begin drinking solution. Drink an eight (8) ounce cup every 10-15 minutes until you have consumed half (32 ounces) of the solution. Refrigerate  remaining solution.    Night before the procedure (8:00 pm):  Take two (2) 5 mg Dulcolax laxative tablets.    Beginning 5 hours before your procedure:  Drink the remaining amount of prepared solution (32 ounces). Drink an eight (8) ounce cup every 10-15 minutes until you have consumed the remaining solution.      Bowel prep should be completed 4 hours prior to procedure time.  NOTHING TO EAT OR DRINK AFTER MIDNIGHT -EXCEPT YOUR BOWEL PREP                                                                                                                                                                                DAY OF THE PROCEDURE:  You may brush your teeth.  Leave all jewelry at home. Take out any facial piercings, if able.  Please arrive for your procedure as indicated by the OR / GI Lab / Endoscopy Unit. The hospital will contact you the day before with your exact arrival time.  Make sure you have arranged ahead of time for a responsible adult (18 or older) to accompany and drive you home after the procedure. Please discuss any transportation concerns with our staff prior to your procedure.  The effects of the anesthesia can persist for 24 hours. After receiving the sedation, you must exercise caution before engaging in any activity that could harm yourself and others (such as driving a car). Do not make any important decisions or do not drink any alcoholic beverages during this time period.  After your procedure, you may have anything you’d like to eat or drink. You will probably want to start with something light. Please include plenty of fluids. Avoid items that cause gas such as sodas and salads.      SPECIAL INSTRUCTIONS:    For patients currently taking blood thinners and/or antiplatelet therapy our office will contact the prescribing provider. Our office will contact you with any required changes to your medication regimen.  Blood thinner (i.e. - Coumadin, Pradaxa, Lovenox, Xarelto,  Eliquis)  N/A  Antiplatelet (i.e. - Plavix, Aggrenox, Effient, Brilinta)  N/A        Diabetes:  If you are Diabetic, please see separate Diabetic Instruction Sheet.  Prescribed medications:  Do not stop your aspirin, or any of your other medications (unless instructed otherwise).  Take the rest of your prescribed medications with small sips of water at least 2 hours prior to your procedure.      NOTHING TO EAT OR DRINK (INCLUDING CHEWING GUM) AFTER 12 MIDNIGHT PRIOR TO YOUR PROCEDURE EXCEPT YOUR BOWEL PREP.        For any questions or concerns related to your bowel preparation or pre-procedure instructions, please contact our office.  Thank you for choosing [facility] Colon [facility] Rect[facility]!    Revised 1/2023    [phone]                                                                          Medicine Instructions for Adults with Diabetes who Need a Bowel Prep       Follow these instructions when a BOWEL PREP is required for your procedure or surgery!    NOTE:   GLP-1 Agonists taken weekly: do not take in the 7 days before your procedure   SGLT-2 Inhibitors: do not take in the 4 days before your procedure     On the Day Before Surgery/Procedure  If you are having a procedure (e.g. Colonoscopy) or surgery that requires a bowel prep and you may have at least a clear liquid diet, follow the directions below based on the type of medicine you take for your diabetes.     Type of Medicine You Take Examples What to do   Pre-Mixed Insulin - Intermediate Acting Humalog® 75/25, Humulin® 70/30, Novolog® 70/30, Regular Insulin Take ½ your regular dose the evening before your procedure   Rapid/Fast Acting Insulin Humalog® U200, NovoLog®, Apidra®, Fiasp® Take ½ your regular dose the evening before your procedure.   Long-Acting Insulin Lantus®, Levemir®, Tresiba®, Toujeo®, Basaglar® Take your FULL regular dose the day before procedure   Oral Sulfonylurea Glipizide/Glimepiride/Glucotrol® Take ½ your regular dose the evening  before your procedure   Other Oral Diabetes Medicines Metformin®, Glucophage®, Glucophage XR®, Riomet®, Glumetza®), Actose®, Avandia®, Glyset®, Prandin® Take your regular dose with dinner in the evening before your procedure   GLP-1 Agonists AdlyxinÒ, ByettaÒ, BydureonÒ, OzempicÒ, SoliquaÒ, TanzeumÒ, TrulicityÒ, VictozaÒ, Saxenda®, Rybelsus® If taken daily, take as normal    If taken weekly, do not take this medicine for 7 days before your procedure including the day of the procedure (resume taking after the procedure)   SGLT-2 Inhibitors Jardiance®, Invokana®, Farxiga®,   Steglatro®, Brenzavvy®, Qtern®, Segluromet®, Glyxambi®, Synjardy®, Synjardy XR®, Invokamet®, Invokamet XR®, Trijary XR®, Xigduo XR®, Steglujan® Do not take for 4 days before your procedure including the day of the procedure (resume taking after the procedure)                More information continued on back                    Medicine Instructions for Adults with Diabetes who Need a Bowel Prep  Page 2      On the Day of Surgery/Procedure  Follow the directions below based on the type of medicine you take for your diabetes.     Type of Medicine You Take Examples What to do   Long-Acting Insulin Lantus®, Levemir®, Tresiba®, Toujeo®, Basaglar®, Semglee®   If you usually take your Long-Acting Insulin in the morning, take the full dose as scheduled.   GLP-1 Agonists AdlyxinÒ, ByettaÒ, BydureonÒ, OzempicÒ, SoliquaÒ, TanzeumÒ, TrulicityÒ, VictozaÒ, Saxenda®, Rybelsus® Do NOT take this medicine on the day of your procedure (resume taking after the procedure)       On the Day of Surgery/Procedure (continued)  Except for the morning Long-Acting Insulin, DO NOT take ANY diabetic medicine on the day of your procedure unless you were instructed by the doctor who manages your diabetes medicines.    Continue to check your blood sugars.  If you have an insulin pump, ask your endocrinologist for instructions at least 3 days before your procedure. NOTE: If you are  not able to ask your endocrinologist in advance, on the day of the procedure set your insulin pump to your basal rate only. Bring your insulin pump supplies to the hospital.     If you have any questions about taking your diabetes medicines prior to your procedure, please contact the doctor who manages your diabetes medicines.

## 2025-05-06 ENCOUNTER — PREP FOR PROCEDURE (OUTPATIENT)
Age: 70
End: 2025-05-06

## 2025-05-06 ENCOUNTER — TELEPHONE (OUTPATIENT)
Age: 70
End: 2025-05-06

## 2025-05-06 DIAGNOSIS — K62.5 ANAL BLEEDING: Primary | ICD-10-CM

## 2025-05-06 NOTE — TELEPHONE ENCOUNTER
Pt called stated he is scheduled for colonoscopy tomorrow with Dr Montgomery he has the paperwork but nothing was showing on the future appt called the [facility] spoken with Holli so she fixed it and transferred the pt to her for the further assistance

## 2025-05-07 ENCOUNTER — HOSPITAL ENCOUNTER (OUTPATIENT)
Dept: GASTROENTEROLOGY | Facility: HOSPITAL | Age: 70
Setting detail: OUTPATIENT SURGERY
Discharge: HOME | End: 2025-05-07
Attending: COLON & RECTAL SURGERY
Payer: MEDICARE

## 2025-05-07 ENCOUNTER — ANESTHESIA EVENT (OUTPATIENT)
Dept: GASTROENTEROLOGY | Facility: HOSPITAL | Age: 70
End: 2025-05-07
Payer: MEDICARE

## 2025-05-07 ENCOUNTER — ANESTHESIA (OUTPATIENT)
Dept: GASTROENTEROLOGY | Facility: HOSPITAL | Age: 70
End: 2025-05-07
Payer: MEDICARE

## 2025-05-07 VITALS
RESPIRATION RATE: 16 BRPM | WEIGHT: 207 LBS | HEIGHT: 68 IN | HEART RATE: 74 BPM | DIASTOLIC BLOOD PRESSURE: 60 MMHG | TEMPERATURE: 97.6 F | OXYGEN SATURATION: 97 % | SYSTOLIC BLOOD PRESSURE: 120 MMHG | BODY MASS INDEX: 31.37 KG/M2

## 2025-05-07 DIAGNOSIS — Z86.0100 HISTORY OF COLON POLYPS: ICD-10-CM

## 2025-05-07 DIAGNOSIS — K62.5 ANAL BLEEDING: ICD-10-CM

## 2025-05-07 PROBLEM — Z51.81 ENCOUNTER FOR THERAPEUTIC DRUG LEVEL MONITORING: Status: ACTIVE | Noted: 2025-05-07

## 2025-05-07 PROBLEM — Z86.718 PERSONAL HISTORY OF OTHER VENOUS THROMBOSIS AND EMBOLISM: Status: ACTIVE | Noted: 2025-05-07

## 2025-05-07 PROBLEM — M75.102 ROTATOR CUFF TEAR, LEFT: Status: ACTIVE | Noted: 2024-08-28

## 2025-05-07 PROBLEM — G89.29 CHRONIC BILATERAL LOW BACK PAIN WITHOUT SCIATICA: Status: ACTIVE | Noted: 2017-03-09

## 2025-05-07 PROBLEM — S76.112A RUPTURE OF LEFT QUADRICEPS TENDON: Status: ACTIVE | Noted: 2022-03-15

## 2025-05-07 PROBLEM — R91.8 LEFT UPPER LOBE PULMONARY INFILTRATE: Status: ACTIVE | Noted: 2025-03-10

## 2025-05-07 PROBLEM — K63.5 POLYP OF COLON: Status: ACTIVE | Noted: 2025-05-07

## 2025-05-07 PROBLEM — H10.10 ALLERGIC CONJUNCTIVITIS: Status: ACTIVE | Noted: 2025-05-07

## 2025-05-07 PROBLEM — H02.889 MEIBOMIAN GLAND DYSFUNCTION (MGD): Status: ACTIVE | Noted: 2025-05-07

## 2025-05-07 PROBLEM — J30.9 ALLERGIC RHINITIS: Status: ACTIVE | Noted: 2017-03-09

## 2025-05-07 PROBLEM — I15.9 SECONDARY HYPERTENSION, UNSPECIFIED: Status: ACTIVE | Noted: 2025-05-07

## 2025-05-07 PROBLEM — I82.409 DEEP VENOUS THROMBOSIS (HCC): Status: ACTIVE | Noted: 2025-03-06

## 2025-05-07 PROBLEM — H43.819 POSTERIOR VITREOUS DETACHMENT: Status: ACTIVE | Noted: 2025-05-07

## 2025-05-07 PROBLEM — H52.4 PRESBYOPIA: Status: ACTIVE | Noted: 2025-05-07

## 2025-05-07 PROBLEM — J43.9 PULMONARY EMPHYSEMA (HCC): Status: ACTIVE | Noted: 2025-05-07

## 2025-05-07 PROBLEM — E78.5 HYPERLIPIDEMIA: Status: ACTIVE | Noted: 2025-05-07

## 2025-05-07 PROBLEM — Z02.89 PAIN MANAGEMENT CONTRACT SIGNED: Status: ACTIVE | Noted: 2018-09-25

## 2025-05-07 PROBLEM — Z98.890 S/P LEFT KNEE SURGERY: Status: ACTIVE | Noted: 2022-05-13

## 2025-05-07 PROBLEM — Z98.890 HISTORY OF COLONOSCOPY: Status: ACTIVE | Noted: 2020-02-05

## 2025-05-07 PROBLEM — J45.909 ASTHMA: Status: ACTIVE | Noted: 2025-05-07

## 2025-05-07 PROBLEM — N52.9 ERECTILE DYSFUNCTION: Status: ACTIVE | Noted: 2017-03-09

## 2025-05-07 PROBLEM — H43.399 VITREOUS SYNERESIS: Status: ACTIVE | Noted: 2025-05-07

## 2025-05-07 PROBLEM — J45.40 MODERATE PERSISTENT ASTHMA WITHOUT COMPLICATION: Status: ACTIVE | Noted: 2017-03-09

## 2025-05-07 PROBLEM — Z91.89 OTHER SPECIFIED PERSONAL RISK FACTORS, NOT ELSEWHERE CLASSIFIED: Status: ACTIVE | Noted: 2025-05-07

## 2025-05-07 PROBLEM — L71.9 ROSACEA: Status: ACTIVE | Noted: 2017-03-09

## 2025-05-07 PROBLEM — Z87.01 HX OF BACTERIAL PNEUMONIA: Status: ACTIVE | Noted: 2021-05-25

## 2025-05-07 PROBLEM — M54.50 CHRONIC BILATERAL LOW BACK PAIN WITHOUT SCIATICA: Status: ACTIVE | Noted: 2017-03-09

## 2025-05-07 PROBLEM — Z79.01 LONG TERM (CURRENT) USE OF ANTICOAGULANTS: Status: ACTIVE | Noted: 2025-05-07

## 2025-05-07 PROBLEM — K86.2 CYST OF PANCREAS: Status: ACTIVE | Noted: 2025-03-06

## 2025-05-07 PROBLEM — H52.209 ASTIGMATISM: Status: ACTIVE | Noted: 2025-05-07

## 2025-05-07 PROCEDURE — 88305 TISSUE EXAM BY PATHOLOGIST: CPT | Performed by: PATHOLOGY

## 2025-05-07 PROCEDURE — 45385 COLONOSCOPY W/LESION REMOVAL: CPT | Performed by: COLON & RECTAL SURGERY

## 2025-05-07 RX ORDER — PROPOFOL 10 MG/ML
INJECTION, EMULSION INTRAVENOUS AS NEEDED
Status: DISCONTINUED | OUTPATIENT
Start: 2025-05-07 | End: 2025-05-07

## 2025-05-07 RX ORDER — LIDOCAINE HYDROCHLORIDE 10 MG/ML
INJECTION, SOLUTION EPIDURAL; INFILTRATION; INTRACAUDAL; PERINEURAL AS NEEDED
Status: DISCONTINUED | OUTPATIENT
Start: 2025-05-07 | End: 2025-05-07

## 2025-05-07 RX ORDER — SODIUM CHLORIDE, SODIUM LACTATE, POTASSIUM CHLORIDE, CALCIUM CHLORIDE 600; 310; 30; 20 MG/100ML; MG/100ML; MG/100ML; MG/100ML
INJECTION, SOLUTION INTRAVENOUS CONTINUOUS PRN
Status: DISCONTINUED | OUTPATIENT
Start: 2025-05-07 | End: 2025-05-07

## 2025-05-07 RX ADMIN — PROPOFOL 50 MG: 10 INJECTION, EMULSION INTRAVENOUS at 10:59

## 2025-05-07 RX ADMIN — PROPOFOL 30 MG: 10 INJECTION, EMULSION INTRAVENOUS at 10:49

## 2025-05-07 RX ADMIN — PROPOFOL 50 MG: 10 INJECTION, EMULSION INTRAVENOUS at 11:05

## 2025-05-07 RX ADMIN — PROPOFOL 50 MG: 10 INJECTION, EMULSION INTRAVENOUS at 10:45

## 2025-05-07 RX ADMIN — SODIUM CHLORIDE, SODIUM LACTATE, POTASSIUM CHLORIDE, AND CALCIUM CHLORIDE: .6; .31; .03; .02 INJECTION, SOLUTION INTRAVENOUS at 10:38

## 2025-05-07 RX ADMIN — PROPOFOL 30 MG: 10 INJECTION, EMULSION INTRAVENOUS at 11:02

## 2025-05-07 RX ADMIN — LIDOCAINE HYDROCHLORIDE 50 MG: 10 INJECTION, SOLUTION EPIDURAL; INFILTRATION; INTRACAUDAL at 10:43

## 2025-05-07 RX ADMIN — PROPOFOL 50 MG: 10 INJECTION, EMULSION INTRAVENOUS at 10:54

## 2025-05-07 RX ADMIN — PROPOFOL 120 MG: 10 INJECTION, EMULSION INTRAVENOUS at 10:43

## 2025-05-07 NOTE — ANESTHESIA POSTPROCEDURE EVALUATION
Post-Op Assessment Note    CV Status:  Stable  Pain Score: 0    Pain management: adequate       Mental Status:  Sleepy   Hydration Status:  Stable   PONV Controlled:  None   Airway Patency:  Patent     Post Op Vitals Reviewed: Yes    No anethesia notable event occurred.    Staff: CRNA           Last Filed PACU Vitals:  Vitals Value Taken Time   Temp     Pulse 67    /63    Resp 13    SpO2 99

## 2025-05-07 NOTE — INTERVAL H&P NOTE
H&P reviewed. After examining the patient I find no changes in the patients condition since the H&P had been written.    Vitals:    05/07/25 0947   BP: (!) 201/91   Pulse: 79   Resp: 18   Temp: 97.6 °F (36.4 °C)   SpO2: 97%

## 2025-05-07 NOTE — ANESTHESIA PREPROCEDURE EVALUATION
Procedure:  COLONOSCOPY    Relevant Problems   CARDIO   (+) Deep venous thrombosis (HCC)   (+) Hyperlipidemia   (+) Secondary hypertension, unspecified      GI/HEPATIC   (+) Anal bleeding   (+) Cyst of pancreas      MUSCULOSKELETAL   (+) Chronic bilateral low back pain without sciatica      NEURO/PSYCH   (+) Chronic bilateral low back pain without sciatica      PULMONARY   (+) Asthma   (+) Moderate persistent asthma without complication   (+) Pulmonary emphysema (HCC)      Ear/Nose/Throat   (+) Allergic rhinitis      Care Coordination   (+) Pain management contract signed      Eye   (+) Allergic conjunctivitis   (+) Astigmatism   (+) Meibomian gland dysfunction (MGD)   (+) Presbyopia   (+) Vitreous syneresis      Rheumatology   (+) Posterior vitreous detachment   (+) Rupture of left quadriceps tendon      Surgery/Wound/Pain   (+) S/P left knee surgery      Orthopedic/Musculoskeletal   (+) Rotator cuff tear, left      Respiratory/Allergy   (+) Left upper lobe pulmonary infiltrate      FEN/Gastrointestinal   (+) Polyp of colon      Other   (+) Encounter for therapeutic drug level monitoring   (+) Erectile dysfunction   (+) History of colon polyps   (+) History of colonoscopy   (+) Other specified personal risk factors, not elsewhere classified   (+) Personal history of other venous thrombosis and embolism   (+) Rosacea      Lab Results   Component Value Date    SODIUM 137 02/19/2025    K 4.0 02/19/2025     02/19/2025    CO2 31 02/19/2025    AGAP 4 02/19/2025    BUN 17 02/19/2025    CREATININE 0.83 02/19/2025    GLUC 110 02/10/2025    GLUF 88 02/19/2025    CALCIUM 8.8 02/19/2025    AST 15 02/19/2025    ALT 14 02/19/2025    ALKPHOS 49 02/19/2025    TP 7.2 02/19/2025    TBILI 0.41 02/19/2025    EGFR 89 02/19/2025     Lab Results   Component Value Date    WBC 9.69 02/19/2025    HGB 13.4 02/19/2025    HCT 40.3 02/19/2025    MCV 82 02/19/2025     02/19/2025         Physical Exam    Airway    Mallampati score:  II  TM Distance: >3 FB  Neck ROM: full     Dental       Cardiovascular      Pulmonary      Other Findings        Anesthesia Plan  ASA Score- 2     Anesthesia Type- IV sedation with anesthesia with ASA Monitors.         Additional Monitors:     Airway Plan:            Plan Factors-Exercise tolerance (METS): >4 METS.    Chart reviewed. EKG reviewed. Imaging results reviewed. Existing labs reviewed. Patient summary reviewed.                  Induction- intravenous.    Postoperative Plan-         Informed Consent- Anesthetic plan and risks discussed with patient.  I personally reviewed this patient with the CRNA. Discussed and agreed on the Anesthesia Plan with the CRNA..      NPO Status:  Vitals Value Taken Time   Date of last liquid 05/06/25 05/07/25 0942   Time of last liquid 2200 05/07/25 0942   Date of last solid 05/05/25 05/07/25 0942   Time of last solid 2000 05/07/25 0942

## 2025-05-12 ENCOUNTER — RESULTS FOLLOW-UP (OUTPATIENT)
Dept: OTHER | Facility: HOSPITAL | Age: 70
End: 2025-05-12

## 2025-05-12 PROCEDURE — 88305 TISSUE EXAM BY PATHOLOGIST: CPT | Performed by: PATHOLOGY

## 2025-05-12 NOTE — LETTER
May 12, 2025     Dieudonne Marqueztito Awan  [address] [address]      Dear Mr. Andres:    Below are the results from your recent visit: Colonoscopy     Resulted Orders   Tissue Exam   Result Value Ref Range    Case Report       Surgical Pathology Report                         Case: [id number]                                  Authorizing Provider:  CARLA Montgomery MD       Collected:           05/07/2025 1102              Ordering Location:     [facility]   Received:            05/07/2025 7229                                     Endoscopy                                                                    Pathologist:           Javon Rodarte DO                                                            Specimen:    Large Intestine, Left/Descending Colon, cold snare polyp x2                                Final Diagnosis       A. Descending colon, polyps x2, cold snare:  -Tubular adenoma(s), multiple fragments, negative for high grade dysplasia.        Additional Information       All reported additional testing was performed with appropriately reactive controls.  These tests were developed and their performance characteristics determined by [facility] or appropriate performing facility, though some tests may be performed on tissues which have not been validated for performance characteristics (such as staining performed on alcohol exposed cell blocks and decalcified tissues).  Results should be interpreted with caution and in the context of the patients’ clinical condition. These tests may not be cleared or approved by the U.S. Food and Drug Administration, though the FDA has determined that such clearance or approval is not necessary. These tests are used for clinical purposes and they should not be regarded as investigational or for research. This laboratory has been approved by CLIA 88, designated as a high-complexity laboratory and is qualified to perform  "these tests.    Interpretation performed at [facility]  [address], [place] [address]      Synoptic Checklist          COLON/RECTUM POLYP FORM - GI - All Specimens          :    Adenoma(s)      Gross Description       A. The specimen is received in formalin, labeled with the patient's name and hospital number, and is designated \" large intestine, left, cold snare of polyp x 2\".  It consists of at least 5 irregular fragments of pale-tan soft tissue ranging from 0.3 to 0.5 cm in greatest dimension admixed with fecal material.  The specimen is submitted in toto in screen cassette A1.      Note: The estimated total formalin fixation time based upon information provided by the submitting clinician and the standard processing schedule is under 72 hours.    [place]         The test results shown above are benign.     If you have any questions or concerns, please don't hesitate to call.         Sincerely,        KASSIDY Montgomery MD  "